# Patient Record
Sex: MALE | Race: WHITE | Employment: STUDENT | URBAN - METROPOLITAN AREA
[De-identification: names, ages, dates, MRNs, and addresses within clinical notes are randomized per-mention and may not be internally consistent; named-entity substitution may affect disease eponyms.]

---

## 2018-09-30 ENCOUNTER — HOSPITAL ENCOUNTER (INPATIENT)
Facility: CLINIC | Age: 23
LOS: 4 days | Discharge: HOME | End: 2018-10-05
Attending: PSYCHIATRY & NEUROLOGY | Admitting: PSYCHIATRY & NEUROLOGY
Payer: COMMERCIAL

## 2018-09-30 DIAGNOSIS — R45.851 SUICIDAL IDEATION: ICD-10-CM

## 2018-09-30 DIAGNOSIS — F41.1 GAD (GENERALIZED ANXIETY DISORDER): Primary | ICD-10-CM

## 2018-09-30 DIAGNOSIS — F12.93 CANNABIS WITHDRAWAL (H): ICD-10-CM

## 2018-09-30 DIAGNOSIS — F33.2 SEVERE RECURRENT MAJOR DEPRESSION WITHOUT PSYCHOTIC FEATURES (H): ICD-10-CM

## 2018-09-30 DIAGNOSIS — F33.1 MAJOR DEPRESSIVE DISORDER, RECURRENT EPISODE, MODERATE (H): ICD-10-CM

## 2018-09-30 DIAGNOSIS — F12.10 CANNABIS ABUSE: ICD-10-CM

## 2018-09-30 LAB
AMPHETAMINES UR QL SCN: NEGATIVE
BARBITURATES UR QL: NEGATIVE
BENZODIAZ UR QL: NEGATIVE
CANNABINOIDS UR QL SCN: POSITIVE
COCAINE UR QL: NEGATIVE
ETHANOL UR QL SCN: NEGATIVE
OPIATES UR QL SCN: NEGATIVE

## 2018-09-30 PROCEDURE — 90791 PSYCH DIAGNOSTIC EVALUATION: CPT

## 2018-09-30 PROCEDURE — 99285 EMERGENCY DEPT VISIT HI MDM: CPT | Mod: Z6 | Performed by: PSYCHIATRY & NEUROLOGY

## 2018-09-30 PROCEDURE — 99285 EMERGENCY DEPT VISIT HI MDM: CPT | Mod: 25 | Performed by: PSYCHIATRY & NEUROLOGY

## 2018-09-30 PROCEDURE — 25000132 ZZH RX MED GY IP 250 OP 250 PS 637: Performed by: PSYCHIATRY & NEUROLOGY

## 2018-09-30 PROCEDURE — 80307 DRUG TEST PRSMV CHEM ANLYZR: CPT | Performed by: PSYCHIATRY & NEUROLOGY

## 2018-09-30 PROCEDURE — 80320 DRUG SCREEN QUANTALCOHOLS: CPT | Performed by: PSYCHIATRY & NEUROLOGY

## 2018-09-30 RX ORDER — TRAZODONE HYDROCHLORIDE 100 MG/1
100 TABLET ORAL ONCE
Status: COMPLETED | OUTPATIENT
Start: 2018-09-30 | End: 2018-09-30

## 2018-09-30 RX ADMIN — TRAZODONE HYDROCHLORIDE 100 MG: 100 TABLET ORAL at 22:29

## 2018-09-30 ASSESSMENT — ENCOUNTER SYMPTOMS
GASTROINTESTINAL NEGATIVE: 1
SLEEP DISTURBANCE: 1
HALLUCINATIONS: 0
NEUROLOGICAL NEGATIVE: 1
ACTIVITY CHANGE: 1
NERVOUS/ANXIOUS: 1
CARDIOVASCULAR NEGATIVE: 1
HEMATOLOGIC/LYMPHATIC NEGATIVE: 1
MUSCULOSKELETAL NEGATIVE: 1
DYSPHORIC MOOD: 1
EYES NEGATIVE: 1
DECREASED CONCENTRATION: 1
HYPERACTIVE: 0
RESPIRATORY NEGATIVE: 1
ENDOCRINE NEGATIVE: 1

## 2018-09-30 NOTE — IP AVS SNAPSHOT
MRN:[id number]                      After Visit Summary   9/30/2018    Logan Drake    MRN: [id number]           Thank you!     Thank you for choosing [facility] for your care. Our goal is always to provide you with excellent care.        Patient Information     Date Of Birth          1995        Designated Caregiver       Most Recent Value    Caregiver    Will someone help with your care after discharge? no      About your hospital stay     You were admitted on:  October 1, 2018 You last received care in the:   [facility]    You were discharged on:  October 5, 2018       Who to Call     For medical emergencies, please call 911.  For non-urgent questions about your medical care, please call your primary care provider or clinic, [phone]          Attending Provider     Provider Specialty    Jose Cochran MD Psychiatry    Dongre, Danielito Sierra MD Psychiatry       Primary Care Provider Office Phone # Fax #    [facility] [phone]      Further instructions from your care team        Behavioral Discharge Planning and Instructions      Summary:  You were admitted on 9/30/2018  due to suicidal ideation and substance abuse..  You were treated by Dr Danielito Cartagena MD and discharged on 10/5/18 from [facility]  to [facility] [place].    Principal Diagnosis: Major Depressive Disorder, Recurrent, Severe; Marijuana Abuse Disorder    Health Care Follow-up Appointments: (Patient to set up appointments after discharge.)  [facility] ([facility] Services for medication management)  [address] [place] [address]  [phone]    Counseling Services (Therapists available)  [address] [place] [address]  [phone]  [facility] [place] [address]  [phone]    Additional Resources in [place]:  Outpatient [facility]  *Ask for new patient coordinator  [address] [place] [address]  [place]  [phone]    [facility] is not accepting new patients at this time.      Attend all scheduled appointments with your outpatient providers. Call at least 24 hours in advance if you need to reschedule an appointment to ensure continued access to your outpatient providers.   Major Treatments, Procedures and Findings:  You were provided with: a psychiatric assessment, assessed for medical stability, medication evaluation and/or management and group therapy    Symptoms to Report: thoughts of suicide    Early warning signs can include: increased depression or anxiety sleep disturbances increased thoughts or behaviors of suicide or self-harm     Safety and Wellness:  Take all medicines as directed.  Make no changes unless your doctor suggests them.      Follow treatment recommendations.  Refrain from alcohol and non-prescribed drugs.  If there is a concern for safety, call 911.    Resources:   Crisis Intervention: 551.152.1466 or 954-439-6324 (TTY: 907.735.7490).  Call anytime for help.  National [place] [employer] Mental Illness (www.mn.girish.org): 926.461.3412 or 014-199-9295.    The treatment team has appreciated the opportunity to work with you.     If you have any questions or concerns our unit number is [phone].      Pending Results     No orders found from 9/28/2018 to 10/1/2018.            Admission Information     Date & Time Provider Department Dept. Phone    9/30/2018 Danielito Cartagena MD [facility] [phone]      Your Vitals Were     Blood Pressure Pulse Temperature Respirations Height Weight    145/87 73 97.4  F (36.3  C) (Oral) 16 1.854 m (6' 1\") 77.2 kg (170 lb 3.2 oz)    Pulse Oximetry BMI (Body Mass Index)                95% 22.46 kg/m2          My[employer] A/S Information     [email or web address] lets you send messages to your doctor, view your test results, renew your prescriptions, schedule appointments and more. To sign up, go to [email or web address] . Click on \"Log in\" on the " "left side of the screen, which will take you to the Welcome page. Then click on \"Sign up Now\" on the right side of the page.     You will be asked to enter the access code listed below, as well as some personal information. Please follow the directions to create your username and password.     Your access code is: [email or web address]  Expires: 2019  4:11 PM     Your access code will  in 90 days. If you need help or a new code, please call your [facility] clinic or [phone].        Care EveryWhere ID     This is your Care EveryWhere ID. This could be used by other organizations to access your [facility] medical records  [id number]        Equal Access to Services     JANET ROGERS : Azul Francisco, del espinosa, ady lópez, adrianna cueva. So [facility] [phone].    ATENCIÓN: Si habla español, tiene a vee disposición servicios gratuitos de asistencia lingüística. Llame al [phone].    We comply with applicable federal civil rights laws and [place] laws. We do not discriminate on the basis of race, color, national origin, age, disability, sex, sexual orientation, or gender identity.               Review of your medicines      START taking        Dose / Directions    busPIRone 10 MG tablet   Commonly known as:  BUSPAR   Used for:  Major depressive disorder, recurrent episode, moderate (H)        Dose:  10 mg   Take 1 tablet (10 mg) by mouth 2 times daily   Quantity:  60 tablet   Refills:  1       hydrOXYzine 25 MG tablet   Commonly known as:  ATARAX        Dose:  25 mg   Take 1 tablet (25 mg) by mouth every 4 hours as needed for anxiety   Quantity:  30 tablet   Refills:  1       melatonin 3 MG tablet   Used for:  Major depressive disorder, recurrent episode, moderate (H)        Dose:  3 mg   Take 1 tablet (3 mg) by mouth At Bedtime   Quantity:  30 tablet   Refills:  1       mirtazapine 15 MG tablet   Commonly known as:  REMERON   Used for:  Major " depressive disorder, recurrent episode, moderate (H)        Dose:  15 mg   Take 1 tablet (15 mg) by mouth At Bedtime   Quantity:  30 tablet   Refills:  1       traZODone 50 MG tablet   Commonly known as:  DESYREL   Used for:  Major depressive disorder, recurrent episode, moderate (H)        Dose:  50 mg   Take 1 tablet (50 mg) by mouth nightly as needed for sleep   Quantity:  15 tablet   Refills:  1            Where to get your medicines      These medications were sent to [facility] [place], [place] - [address]  [address] [facility], [facility] [address]     Phone:  [phone]     busPIRone 10 MG tablet    hydrOXYzine 25 MG tablet    melatonin 3 MG tablet    mirtazapine 15 MG tablet    traZODone 50 MG tablet                Protect others around you: Learn how to safely use, store and throw away your medicines at www.disposemymeds.org.             Medication List: This is a list of all your medications and when to take them. Check marks below indicate your daily home schedule. Keep this list as a reference.      Medications           Morning Afternoon Evening Bedtime As Needed    busPIRone 10 MG tablet   Commonly known as:  BUSPAR   Take 1 tablet (10 mg) by mouth 2 times daily   Last time this was given:  10 mg on 10/5/2018  8:38 AM                                hydrOXYzine 25 MG tablet   Commonly known as:  ATARAX   Take 1 tablet (25 mg) by mouth every 4 hours as needed for anxiety   Last time this was given:  25 mg on 10/4/2018  9:23 PM                                melatonin 3 MG tablet   Take 1 tablet (3 mg) by mouth At Bedtime   Last time this was given:  3 mg on 10/4/2018  9:23 PM                                mirtazapine 15 MG tablet   Commonly known as:  REMERON   Take 1 tablet (15 mg) by mouth At Bedtime   Last time this was given:  15 mg on 10/4/2018  9:23 PM                                traZODone 50 MG tablet   Commonly known as:  DESYREL   Take 1 tablet (50 mg) by mouth  nightly as needed for sleep   Last time this was given:  100 mg on 9/30/2018 10:29 PM

## 2018-09-30 NOTE — LETTER
To Whom It May Concern,      Logan Drake was hospitalized at [facility] from 9/30/2018 - 10/05/2018, and I was his inpatient psychiatrist during his time. We discussed medications and therapy as useful treatment modalities to treat his depression and anxiety. The patient has found benefit with a therapy animal in the past. After learning the useful benefit of a therapy animal in individuals with high anxiety and depression, I will consider this an adjunctive treatment modality (along with continued medications and therapy) to help with his psychiatric diagnoses. For therapeutic purposes, please consider allowing Logan to have an animal (dog or a cat) at his place of living. Thank you.    Sincerely,      Danielito Cartagena MD  [facility] Psychiatry  [facility]

## 2018-09-30 NOTE — IP AVS SNAPSHOT
[address] [address]    Phone:  [phone]                                       After Visit Summary   9/30/2018    Logan Drake    MRN: [id number]           After Visit Summary Signature Page     I have received my discharge instructions, and my questions have been answered. I have discussed any challenges I see with this plan with the nurse or doctor.    ..........................................................................................................................................  Patient/Patient Representative Signature      ..........................................................................................................................................  Patient Representative Print Name and Relationship to Patient    ..................................................               ................................................  Date                                   Time    ..........................................................................................................................................  Reviewed by Signature/Title    ...................................................              ..............................................  Date                                               Time          22EPIC Rev 08/18

## 2018-10-01 PROBLEM — R45.851 SUICIDAL IDEATION: Status: ACTIVE | Noted: 2018-10-01

## 2018-10-01 PROCEDURE — 99207 ZZC CDG-HISTORY COMP: MEETS EXP. PROB FOCUSED- DOWN CODED LACK OF PFSH: CPT | Performed by: PSYCHIATRY & NEUROLOGY

## 2018-10-01 PROCEDURE — G0177 OPPS/PHP; TRAIN & EDUC SERV: HCPCS

## 2018-10-01 PROCEDURE — 12400007 ZZH R&B MH INTERMEDIATE UMMC

## 2018-10-01 PROCEDURE — 99221 1ST HOSP IP/OBS SF/LOW 40: CPT | Mod: AI | Performed by: PSYCHIATRY & NEUROLOGY

## 2018-10-01 PROCEDURE — 25000132 ZZH RX MED GY IP 250 OP 250 PS 637: Performed by: PSYCHIATRY & NEUROLOGY

## 2018-10-01 RX ORDER — ALUMINA, MAGNESIA, AND SIMETHICONE 2400; 2400; 240 MG/30ML; MG/30ML; MG/30ML
30 SUSPENSION ORAL EVERY 4 HOURS PRN
Status: DISCONTINUED | OUTPATIENT
Start: 2018-10-01 | End: 2018-10-05 | Stop reason: HOSPADM

## 2018-10-01 RX ORDER — MIRTAZAPINE 7.5 MG/1
7.5 TABLET, FILM COATED ORAL AT BEDTIME
Status: DISCONTINUED | OUTPATIENT
Start: 2018-10-01 | End: 2018-10-02

## 2018-10-01 RX ORDER — ACETAMINOPHEN 325 MG/1
650 TABLET ORAL EVERY 4 HOURS PRN
Status: DISCONTINUED | OUTPATIENT
Start: 2018-10-01 | End: 2018-10-05 | Stop reason: HOSPADM

## 2018-10-01 RX ORDER — OLANZAPINE 10 MG/1
10 TABLET ORAL
Status: DISCONTINUED | OUTPATIENT
Start: 2018-10-01 | End: 2018-10-05 | Stop reason: HOSPADM

## 2018-10-01 RX ORDER — LANOLIN ALCOHOL/MO/W.PET/CERES
3 CREAM (GRAM) TOPICAL AT BEDTIME
Status: DISCONTINUED | OUTPATIENT
Start: 2018-10-01 | End: 2018-10-05 | Stop reason: HOSPADM

## 2018-10-01 RX ORDER — LANOLIN ALCOHOL/MO/W.PET/CERES
3 CREAM (GRAM) TOPICAL
Status: DISCONTINUED | OUTPATIENT
Start: 2018-10-01 | End: 2018-10-01

## 2018-10-01 RX ORDER — BUSPIRONE HYDROCHLORIDE 10 MG/1
10 TABLET ORAL 2 TIMES DAILY
Status: DISCONTINUED | OUTPATIENT
Start: 2018-10-01 | End: 2018-10-05 | Stop reason: HOSPADM

## 2018-10-01 RX ORDER — OLANZAPINE 10 MG/2ML
10 INJECTION, POWDER, FOR SOLUTION INTRAMUSCULAR
Status: DISCONTINUED | OUTPATIENT
Start: 2018-10-01 | End: 2018-10-05 | Stop reason: HOSPADM

## 2018-10-01 RX ORDER — TRAZODONE HYDROCHLORIDE 50 MG/1
50 TABLET, FILM COATED ORAL
Status: DISCONTINUED | OUTPATIENT
Start: 2018-10-01 | End: 2018-10-05 | Stop reason: HOSPADM

## 2018-10-01 RX ORDER — HYDROXYZINE HYDROCHLORIDE 25 MG/1
25 TABLET, FILM COATED ORAL EVERY 4 HOURS PRN
Status: DISCONTINUED | OUTPATIENT
Start: 2018-10-01 | End: 2018-10-05 | Stop reason: HOSPADM

## 2018-10-01 RX ADMIN — BUSPIRONE HYDROCHLORIDE 10 MG: 10 TABLET ORAL at 21:00

## 2018-10-01 RX ADMIN — MIRTAZAPINE 7.5 MG: 7.5 TABLET ORAL at 21:00

## 2018-10-01 RX ADMIN — MELATONIN TAB 3 MG 3 MG: 3 TAB at 21:00

## 2018-10-01 RX ADMIN — BUSPIRONE HYDROCHLORIDE 10 MG: 10 TABLET ORAL at 13:05

## 2018-10-01 ASSESSMENT — ACTIVITIES OF DAILY LIVING (ADL)
ORAL_HYGIENE: INDEPENDENT
DRESS: 0-->INDEPENDENT
TRANSFERRING: 0-->INDEPENDENT
RETIRED_COMMUNICATION: 0-->UNDERSTANDS/COMMUNICATES WITHOUT DIFFICULTY
TOILETING: 0-->INDEPENDENT
BATHING: 0-->INDEPENDENT
DRESS: INDEPENDENT;SCRUBS (BEHAVIORAL HEALTH)
ORAL_HYGIENE: INDEPENDENT
COGNITION: 0 - NO COGNITION ISSUES REPORTED
AMBULATION: 0-->INDEPENDENT
GROOMING: INDEPENDENT
FALL_HISTORY_WITHIN_LAST_SIX_MONTHS: NO
DRESS: INDEPENDENT
LAUNDRY: WITH SUPERVISION
GROOMING: INDEPENDENT
SWALLOWING: 0-->SWALLOWS FOODS/LIQUIDS WITHOUT DIFFICULTY
RETIRED_EATING: 0-->INDEPENDENT

## 2018-10-01 NOTE — ED NOTES
"ED to Behavioral Floor Handoff    SITUATION  Logan Drake is a 23 year old male who speaks English and lives in a home with family members The patient arrived in the ED by private car from home with a complaint of Suicidal (per pt started getting suicidal thoughts this Thurs denies having suicidal plans. Increase depression this week, poor appetite, anhedonia. Today was suppose to drive back to [place] for school, his Mother asked if he would be okay, pt responded no \" I might not make it to [place]\" Parents brought pt in to ER for eval. Denies being officially dx with Depression, \"depression runs strongly in my Mother side\". Denies seeing therapist or Psychiatrist. )  .The patient's current symptoms started/worsened 1 and 10 day(s) ago and during this time the symptoms have increased.   In the ED, pt was diagnosed with   Final diagnoses:   Major depressive disorder, recurrent episode, moderate (H)   Suicidal ideation   Cannabis abuse        Initial vitals were: BP: 141/86  Pulse: 88  Temp: 98.9  F (37.2  C)  Resp: 16  Height: 185.4 cm (6' 1\")  Weight: 78.4 kg (172 lb 12.8 oz)  SpO2: 98 %   --------  Is the patient diabetic? No   If yes, last blood glucose? --     If yes, was this treated in the ED? --  --------  Is the patient inebriated (ETOH) No or Impaired on other substances? No  MSSA done? N/A  Last MSSA score: --    Were withdrawal symptoms treated? N/A  Does the patient have a seizure history? No. If yes, date of most recent seizure--  --------  Is the patient patient experiencing suicidal ideation? reports occasional suicidal thoughts representing feeling that life is not worth feeling    Homicidal ideation? denies current or recent homicidal ideation or behaviors.    Self-injurious behavior/urges? denies current or recent self injurious behavior or ideation.  ------  Was pt aggressive in the ED No  Was a code called No  Is the pt now cooperative? Yes  -------  Meds given in ED:   Medications " "  traZODone (DESYREL) tablet 100 mg (100 mg Oral Given 9/30/18 4264)      Family present during ED course? Yes  Family currently present? No    BACKGROUND  Does the patient have a cognitive impairment or developmental disability? No  Allergies: No Known Allergies.   Social demographics are   Social History     Social History     Marital status: Single     Spouse name: N/A     Number of children: N/A     Years of education: N/A     Social History Main Topics     Smoking status: Never Smoker     Smokeless tobacco: Never Used     Alcohol use Yes     Drug use: Yes      Comment: marijuana     Sexual activity: Not Asked     Other Topics Concern     None     Social History Narrative     None        ASSESSMENT  Labs results   Labs Ordered and Resulted from Time of ED Arrival Up to the Time of Departure from the ED   DRUG ABUSE SCREEN 6 CHEM DEP URINE ([facility]) - Abnormal; Notable for the following:        Result Value    Cannabinoids Qual Urine Positive (*)     All other components within normal limits      Imaging Studies: No results found for this or any previous visit (from the past 24 hour(s)).   Most recent vital signs BP (!) 133/92  Pulse 81  Temp 98.9  F (37.2  C) (Oral)  Resp 16  Ht 1.854 m (6' 1\")  Wt 78.4 kg (172 lb 12.8 oz)  SpO2 98%  BMI 22.8 kg/m2   Abnormal labs/tests/findings requiring intervention:---   Pain control: pt had none  Nausea control: pt had none    RECOMMENDATION  Are any infection precautions needed (MRSA, VRE, etc.)? No If yes, what infection? --  ---  Does the patient have mobility issues? independently. If yes, what device does the pt use? ---  ---  Is patient on 72 hour hold or commitment? No If on 72 hour hold, have hold and rights been given to patient? N/A  Are admitting orders written if after 10 p.m. ?N/A  Tasks needing to be completed:---     Cecy guzman-- [phone] [address] ED   [phone] [facility] ED    "

## 2018-10-01 NOTE — ED PROVIDER NOTES
"  History     Chief Complaint   Patient presents with     Suicidal     per pt started getting suicidal thoughts this Thurs denies having suicidal plans. Increase depression this week, poor appetite, anhedonia. Today was suppose to drive back to [place] for school, his Mother asked if he would be okay, pt responded no \" I might not make it to [place]\" Parents brought pt in to ER for eval. Denies being officially dx with Depression, \"depression runs strongly in my Mother side\". Denies seeing therapist or Psychiatrist.      The history is provided by the patient.     Logan Drake is a 23 year old male who is here brought in by parents due to concerns of depression and suicidal thinking. Patient reports history of depression dating back to High School. He currently is in college at [place], [place]. He is a senior. He came home this weekend as she was feeling depressed. Patient was going to return to [place] today but felt he was too depressed and was suicidal. He told parents that he did not know if he would make it to [place]. Patient confessed that he has been smoking marijuana past year. He quit 1 week ago. He denies using other drugs. He is not on any meds nor is seeing a therapist. Patient has never attempted suicide nor been hospitalized psychiatrically. There is genetic loading for both chemical dependence and mood disorder. Mother works as a CD counselor for [employer]. She was seen here in August seeking help.    Please see DEC Crisis Assessment on 9/30/18 in [facility] for further details.    PERSONAL MEDICAL HISTORY  History reviewed. No pertinent past medical history.  PAST SURGICAL HISTORY  History reviewed. No pertinent surgical history.  FAMILY HISTORY  No family history on file.  SOCIAL HISTORY  Social History   Substance Use Topics     Smoking status: Never Smoker     Smokeless tobacco: Never Used     Alcohol use Yes     MEDICATIONS  No current facility-administered medications for this " "encounter.      No current outpatient prescriptions on file.     ALLERGIES  No Known Allergies      I have reviewed the Medications, Allergies, Past Medical and Surgical History, and Social History in the Epic system.    Review of Systems   Constitutional: Positive for activity change.   HENT: Negative.    Eyes: Negative.    Respiratory: Negative.    Cardiovascular: Negative.    Gastrointestinal: Negative.    Endocrine: Negative.    Genitourinary: Negative.    Musculoskeletal: Negative.    Skin: Negative.    Neurological: Negative.    Hematological: Negative.    Psychiatric/Behavioral: Positive for decreased concentration, dysphoric mood, sleep disturbance and suicidal ideas. Negative for hallucinations. The patient is nervous/anxious. The patient is not hyperactive.    All other systems reviewed and are negative.      Physical Exam   BP: 141/86  Pulse: 88  Temp: 98.9  F (37.2  C)  Resp: 16  Height: 185.4 cm (6' 1\")  Weight: 78.4 kg (172 lb 12.8 oz)      Physical Exam   Constitutional: He appears well-developed.   HENT:   Head: Normocephalic.   Eyes: Pupils are equal, round, and reactive to light.   Neck: Normal range of motion.   Cardiovascular: Normal rate.    Pulmonary/Chest: Effort normal.   Abdominal: Soft.   Musculoskeletal: Normal range of motion.   Neurological: He is alert.   Skin: Skin is warm.   Psychiatric: His speech is normal. Judgment normal. He is withdrawn. He is not agitated, not aggressive, not hyperactive, not actively hallucinating and not combative. Thought content is paranoid and delusional. Cognition and memory are normal. He exhibits a depressed mood. He expresses homicidal and suicidal ideation.   Nursing note and vitals reviewed.      ED Course     ED Course     Procedures    Labs Ordered and Resulted from Time of ED Arrival Up to the Time of Departure from the ED - No data to display         Assessments & Plan (with Medical Decision Making)   Patient with recurrent MDD who has been " abusing THC this past year but had stopped using 1 week ago. He now finds himself depressed and suicidal. He feels unsafe. He is open to being hospitalized. He is referred for admission.    I have reviewed the nursing notes.    I have reviewed the findings, diagnosis, plan and need for follow up with the patient.    New Prescriptions    No medications on file       Final diagnoses:   Major depressive disorder, recurrent episode, moderate (H)   Suicidal ideation   Cannabis abuse       9/30/2018   [facility], [place], EMERGENCY DEPARTMENT     Jose Cochran MD  09/30/18 1940

## 2018-10-01 NOTE — PROGRESS NOTES
INITIAL OT NOTE     10/01/18 1400   Occupational Therapy   Type of Intervention structured groups   Response Initiates, socially acceptable   Hours 1     Pt attended 1 out of 3 occupational therapy groups this date. Pt actively participated in a structured occupational therapy group with a focus on a visual-spatial leisure task. Pt was able to follow 2-step directions of the novel task, and demonstrated strategic planning and problem solving throughout task. Pt remained focused for full duration of group. Pt was pleasant and engaged with a congruent affect this date. Will continue to assess, initial assessment and OT care plan/goals to be completed upon additional group participation.

## 2018-10-01 NOTE — PLAN OF CARE
"Problem: Mood Impairment (Depressive Signs/Symptoms) (Adult)  Goal: Improved Mood Symptoms (Depressive Signs/Symptoms)  1) Pt will remain safe without any self harm during hospitalization.  2) Patient will have decreased thoughts of self harm and/or suicidal ideation  3) Patient will report improved sleep and feeling rested upon waking.  4) Patient will have adequate daily oral intake.  5) Patient will have improvement in self-care, including personal hygiene.  6) Patient will verbalize and demonstrate an ability to cope for their age.  7) Patient will be able to participate and initiate activities and conversation with others.  8) Patient will discuss feelings of hopelessness and and express an interest in the future.  9) By discharge the patient will report an increase in sense of control over their current situation.(i.e by verbalizing coping techniques to help get their life back on track and/or naming people they can talk to when they need emotional assistance).   Outcome: No Change    amelia Ford \"Tory\" is a 23 year college student admitted on a voluntary status. Pt has a hx of depression. Pt was bib parents after he verbalized suicidal ideation and inability to maintain his safety if he were to return to [place] where he is a student. Per reports and confirmed by pt, he has been dealing with depression for years.since 6th grade) Depression worsened a year ago after a breakup. He has also been using marijuana consistently for a year +, but recently stopped about a week ago. Utox was (+) for THC. Prior to stopping marijuana, pt reports he has been using it to cope with insomnia, and was able to get 6-7 hours of sleep nightly. After stopping marijuana, suicidal ideation, depression, sleep disturbance have all worsened. Appetite has been poor and pt reports has lost more than 10 Lbs in the last month. (Dietary consult ordered) Also reports increased in suicidal ideations with no specific plan. Pt denies " any prior suicide attempt. Current stressors are school and money. ( pt reports has to work 2 jobs to support himself and is taking full time classes) Pt is not currently on any medications, but has had a short trial with Prozac for just a month about 3-4 years ago. Pt does not have a psychiatrist and does not see a therapist.    On arrival to unit, pt is calm, polite and cooperative with search and admission interview. Presents with a depressed, flat and blunted affect. Denies any AH/VH or HI. Currently denying wish to be dead and has contracted for safety on the unit. Pt endorsing depression 8/10, anxiety 4/10. Pt reports self harming behavior by head banging on wall. (has been placed on SIB precaution). Reports is feeling hopeful and is open to trying medications and therapy to manage his depression. Admission profile completed.   Noted an abrasion to left shin, area slightly red, no drainage.  Pt oriented to unit, questions answered. Pt declines the flu shot at this time.  Is currently in bed. Denies any acute concerns at this time. Nursing will continue to monitor.    Notification of Admission  Per pt, both parents already aware of his admission at this time. He declines offer to contact them.

## 2018-10-01 NOTE — PLAN OF CARE
Problem: Patient Care Overview  Goal: Team Discussion  Team Plan:   BEHAVIORAL TEAM DISCUSSION    Participants: Dr. Danielito Cartagena; Shanell MEJÍA; Payal Willingham Rn    Progress: The patient came to the last two groups of the day.  He is taking all medications prescribed and compliant with care.    Continued Stay Criteria/Rationale: New admission    Medical/Physical: See Consult    Precautions:   Behavioral Orders   Procedures     Code 1 - Restrict to Unit     Routine Programming     As clinically indicated     Self Injury Precaution     Status 15     Every 15 minutes.     Suicide precautions     Patients on Suicide Precautions should have a Combination Diet ordered that includes a Diet selection(s) AND a Behavioral Tray selection for Safe Tray - with utensils, or Safe Tray - NO utensils       Plan: The patient needs psychiatric medication management and stabilization of his mood.  He will continue to be encouraged to attend all unit programming and let the staff know if he has questions or concerns.  CTC to ensure he has outpatient psychiatric and therapy appointments at discharge.    Rationale for change in precautions or plan: No changes

## 2018-10-01 NOTE — PROGRESS NOTES
"Pt was calm with blunted affect, withdrawn from others, did not attend groups. Pt reports feeling better than yesterday, but \"not much\". He endorses 7/10 depression and 4/10 anxiety. Denies SI or SIB thoughts. Denies hallucinations. Pt says he is able to concentrate well enough to read. Endorses racing thoughts about wanting to see his family and girlfriend. Appetite is poor, pt has only eat \"a pudding cup\" since admission. He is hopeful that things will get better. His goal today is \"to feel better than I did yesterday\".      10/01/18 1200   Behavioral Health   Hallucinations denies / not responding to hallucinations   Thinking distractable   Orientation person: oriented;place: oriented;date: oriented;time: oriented   Memory baseline memory   Insight insight appropriate to situation   Judgement impaired   Eye Contact out of corner of eyes;at examiner   Affect blunted, flat   Mood depressed;anxious   Physical Appearance/Attire attire appropriate to age and situation   Hygiene other (see comment)  (Fair)   Suicidality other (see comments)  (Denies)   1. Wish to be Dead No   2. Non-Specific Active Suicidal Thoughts  No   Self Injury other (see comment)  (Denies)   Elopement (Nothing stated or observed)   Activity withdrawn;other (see comment)  (Visible in the milieu)   Speech clear;coherent   Medication Sensitivity no stated side effects;no observed side effects   Psychomotor / Gait balanced;steady     "

## 2018-10-01 NOTE — PROGRESS NOTES
"CLINICAL NUTRITION SERVICES - ASSESSMENT NOTE     Nutrition Prescription    RECOMMENDATIONS FOR MDs/PROVIDERS TO ORDER:  None today    Malnutrition Status:    Non-severe malnutrition in the context of environmental or social circumstances    Recommendations already ordered by Registered Dietitian (RD):  None today - pt declined supplements discussed    Nursing/staff - please encourage meal intakes at lunch and dinner (pt usually skips breakfast) and snacks available on unit    Future/Additional Recommendations:  Consider need for scheduled snacks/supplements pending pt's wt trends and po intakes.     REASON FOR ASSESSMENT  Logan Drake is a/an 23 year old male assessed by the dietitian for Admission Nutrition Risk Screen for unintentional loss of 10# or more in the past two months AND RN Consult - \"Poor appetite, with recent wt loss of 10# in the last month\"    NUTRITION HISTORY  - Reports no known food allergies or intolerances  - Appetite has been \"non-existent for the past week\" and says his appetite was \"fine\" previously. He believes his reduced appetite is d/t stopped using marijuana a week ago.  - Usual intakes of 2 meals/day (skips breakfast d/t not hungry), some days eats a third meal late at night. Snacks throughout day. During the past week, he reports only eating a few meals, some days only eating bites of his food.    CURRENT NUTRITION ORDERS  Diet: Regular  Intake/Tolerance: Since admit, had pudding cup last night but only had bites of breakfast - though it is normal for pt to skip breakfast at home. Pt reports still not feeling very hungry. Denies N/V, constipation, and diarrhea.    LABS reviewed - no BMP available    MEDICATIONS reviewed    ANTHROPOMETRICS  Height: 185.4 cm (6' 1\")  Most Recent Weight: 76.2 kg (168 lb)    IBW: 83.6 kg (91% IBW)  BMI: Normal BMI  Weight History: Per pt, UBW anywhere from 160-175#. He reports weighing himself at home ~2 weeks ago and weighed 178#. Weighed himself " again a few days ago and was 168#. This indicates 10# (5.6%) wt loss over the past ~2 weeks per pt report. No wt information available in CareEverywhere at this time.  Wt Readings from Last 10 Encounters:   10/01/18 76.2 kg (168 lb)     Dosing Weight: 76 kg (actual)    ASSESSED NUTRITION NEEDS  Estimated Energy Needs: 6897-9920+ kcals/day (25 - 30+ kcals/kg)  Justification: Maintenance, recent wt loss  Estimated Protein Needs: 61-76 grams protein/day (0.8 - 1 grams of pro/kg)  Justification: Maintenance  Estimated Fluid Needs: 1 mL/kcal, or per provider  Justification: Maintenance    PHYSICAL FINDINGS  See malnutrition section below.    MALNUTRITION  % Intake: < 75% for > 7 days (non-severe)  % Weight Loss: > 2% in 1 week (severe)  Subcutaneous Fat Loss: None observed  Muscle Loss: None observed  Fluid Accumulation/Edema: None noted  Malnutrition Diagnosis: Non-severe malnutrition in the context of environmental or social circumstances    NUTRITION DIAGNOSIS  Inadequate oral intake related to poor appetite, decreasing desire to eat as evidenced by wt loss of 5.6% over the past ~2 weeks and pt's report of intakes over past week and since admit.      INTERVENTIONS  Implementation  Nutrition Education: Encouraged pt to consume at least 2 meals/day (as this is normal for pt) + snacks available on unit. Encouraged am snack given usually does not eat breakfast. Appetite to likely improve with time as mood improves.    Medical food supplement therapy - discussed supplement options, pt declined at this time    Goals  Patient to consume % of nutritionally adequate meal trays TID, or the equivalent with supplements/snacks.     Monitoring/Evaluation  Progress toward goals will be monitored and evaluated per protocol.    Lynn Stringer RD, LD  Unit pgr: [phone]

## 2018-10-01 NOTE — PHARMACY-ADMISSION MEDICATION HISTORY
Admission medication history for the September 30, 2018 admission is complete.     Interview sources:  Patient    Reliability of source: Patient was a good historian.    Medication compliance: Not applicable, not on any medications.    Preferred Outpatient Pharmacy: [facility] in [facility]     Changes made to PTA medication list (reason)  Added: None  Deleted: None  Changed: None    Additional medication history information:   Patient denies taking any prescriptions or medications over the counter.      Prior to Admission Medication List:  Prior to Admission medications    Not on File       Time spent: 15 minutes    Medication history completed by:   Elizabeth Skaggs, Pharmacy Intern

## 2018-10-01 NOTE — PROGRESS NOTES
10/01/18 0316   Patient Belongings   Did you bring any home meds/supplements to the hospital?  No   Patient Belongings other (see comments)   Disposition of Belongings Locker   Belongings Search Yes   Clothing Search Yes   Second Staff E.AAb     In locker:  Cell phone  Cup   Pair of socks  Pair of underwear    A               Admission:  I am responsible for any personal items that are not sent to the safe or pharmacy.  [facility] is not responsible for loss, theft or damage of any property in my possession.    Signature:  _________________________________ Date: _______  Time: _____                                              Staff Signature:  ____________________________ Date: ________  Time: _____      2nd Staff person, if patient is unable/unwilling to sign:    Signature: ________________________________ Date: ________  Time: _____     Discharge:  [facility] has returned all of my personal belongings:    Signature: _________________________________ Date: ________  Time: _____                                          Staff Signature:  ____________________________ Date: ________  Time: _____

## 2018-10-01 NOTE — PROGRESS NOTES
Initial Psychosocial Assessment    I have reviewed the chart, met with the patient, and developed Care Plan.      Presenting Problem:  The patient is a 23 year-old, male patient admitted with suicidal ideation and substance abuse.  Brought to the hospital ED by his parents. Patient is college student carrying 14 credits, working two part-time, jobs and facing mounting school debt.  Patient admitted to smoking 7 grams of marijuana every 3-4 days at a street value of about $400.00/wk.  This is the patient's first psychiatric admission to [facility].    History of Mental Health and Chemical Dependency:  History of depression dating back to high school. Was on Prozac years ago but stopped taking it due to weight loss, anxiety and insomnia.    Family Description (Constellation, Family Psychiatric History):  Patient is single, never , no children of issue.  He was raised with his younger sister age 19 by his parents.  Mother is a CD Counselor at [employer] and father is an autobody instructor at [email or web address].  There is mental health and addiction issues in the family of origin.    Significant Life Events (Illness, Abuse, Trauma, Death):  None    Living Situation:  Patient is a Senior at [employer] where he resides.    Educational Background:  Senior at [employer] majoring in Economics.    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    Financial Status:  Wages, loans and mounting debt.    Legal Issues:  None    Ethnic/Cultural Issues:  None    Spiritual Orientation:  Identifies as a Congregational.     Service History:  None    Current Treatment Providers are:  Patient sees Primary Care doctors at [facility] in [place], [place].    Social Service Assessment/Plan:  Patient needs psychiatric assessment, medication management and stabilization of his mood.  Will provide the patient with a letter at discharge to present  to his school. He will be encouraged to talk to staff with any concerns or questions he may have.  He has been encouraged to attend all unit programming offered. CTC to ensure that the patient has a comprehensive care plan in place at discharge.

## 2018-10-01 NOTE — ED NOTES
Part of assessment;    Stated that he started suffering from increased insomnia when he stopped smoking marijuana about one week ago.  Said that he has only been sleeping about 3-4 hours a night for about one week and on Thursday he got out of bed at about 3am and started hitting his head against the wall.    He also stated he is a senior in college at [employer] and is taking 14 credits.  Said he is working 20-25 hours a week between his job at [employer] and his internship as a .

## 2018-10-02 LAB
ALBUMIN SERPL-MCNC: 4.7 G/DL (ref 3.4–5)
ALP SERPL-CCNC: 76 U/L (ref 40–150)
ALT SERPL W P-5'-P-CCNC: 28 U/L (ref 0–70)
ANION GAP SERPL CALCULATED.3IONS-SCNC: 5 MMOL/L (ref 3–14)
AST SERPL W P-5'-P-CCNC: 13 U/L (ref 0–45)
BASOPHILS # BLD AUTO: 0 10E9/L (ref 0–0.2)
BASOPHILS NFR BLD AUTO: 0.4 %
BILIRUB DIRECT SERPL-MCNC: 0.2 MG/DL (ref 0–0.2)
BILIRUB SERPL-MCNC: 0.8 MG/DL (ref 0.2–1.3)
BUN SERPL-MCNC: 15 MG/DL (ref 7–30)
CALCIUM SERPL-MCNC: 9.2 MG/DL (ref 8.5–10.1)
CHLORIDE SERPL-SCNC: 107 MMOL/L (ref 94–109)
CO2 SERPL-SCNC: 30 MMOL/L (ref 20–32)
CREAT SERPL-MCNC: 1.31 MG/DL (ref 0.66–1.25)
DIFFERENTIAL METHOD BLD: NORMAL
EOSINOPHIL # BLD AUTO: 0.1 10E9/L (ref 0–0.7)
EOSINOPHIL NFR BLD AUTO: 1 %
ERYTHROCYTE [DISTWIDTH] IN BLOOD BY AUTOMATED COUNT: 12.1 % (ref 10–15)
GFR SERPL CREATININE-BSD FRML MDRD: 69 ML/MIN/1.7M2
GLUCOSE SERPL-MCNC: 118 MG/DL (ref 70–99)
HCT VFR BLD AUTO: 48 % (ref 40–53)
HGB BLD-MCNC: 16.4 G/DL (ref 13.3–17.7)
IMM GRANULOCYTES # BLD: 0 10E9/L (ref 0–0.4)
IMM GRANULOCYTES NFR BLD: 0 %
LYMPHOCYTES # BLD AUTO: 2.3 10E9/L (ref 0.8–5.3)
LYMPHOCYTES NFR BLD AUTO: 44.1 %
MCH RBC QN AUTO: 30.5 PG (ref 26.5–33)
MCHC RBC AUTO-ENTMCNC: 34.2 G/DL (ref 31.5–36.5)
MCV RBC AUTO: 89 FL (ref 78–100)
MONOCYTES # BLD AUTO: 0.5 10E9/L (ref 0–1.3)
MONOCYTES NFR BLD AUTO: 9.7 %
NEUTROPHILS # BLD AUTO: 2.3 10E9/L (ref 1.6–8.3)
NEUTROPHILS NFR BLD AUTO: 44.8 %
NRBC # BLD AUTO: 0 10*3/UL
NRBC BLD AUTO-RTO: 0 /100
PLATELET # BLD AUTO: 198 10E9/L (ref 150–450)
POTASSIUM SERPL-SCNC: 4.3 MMOL/L (ref 3.4–5.3)
PROT SERPL-MCNC: 8.5 G/DL (ref 6.8–8.8)
RBC # BLD AUTO: 5.37 10E12/L (ref 4.4–5.9)
SODIUM SERPL-SCNC: 142 MMOL/L (ref 133–144)
TSH SERPL DL<=0.005 MIU/L-ACNC: 1.52 MU/L (ref 0.4–4)
WBC # BLD AUTO: 5.2 10E9/L (ref 4–11)

## 2018-10-02 PROCEDURE — 80048 BASIC METABOLIC PNL TOTAL CA: CPT | Performed by: PSYCHIATRY & NEUROLOGY

## 2018-10-02 PROCEDURE — 85025 COMPLETE CBC W/AUTO DIFF WBC: CPT | Performed by: PSYCHIATRY & NEUROLOGY

## 2018-10-02 PROCEDURE — G0177 OPPS/PHP; TRAIN & EDUC SERV: HCPCS

## 2018-10-02 PROCEDURE — 12400007 ZZH R&B MH INTERMEDIATE UMMC

## 2018-10-02 PROCEDURE — 36415 COLL VENOUS BLD VENIPUNCTURE: CPT | Performed by: PSYCHIATRY & NEUROLOGY

## 2018-10-02 PROCEDURE — 97127 ZZHC OT THERAPEUTIC INTERVENTION W/FOCUS ON COGNITIVE FUNCTION,EA 15 MIN: CPT | Mod: GO

## 2018-10-02 PROCEDURE — 99231 SBSQ HOSP IP/OBS SF/LOW 25: CPT | Performed by: PSYCHIATRY & NEUROLOGY

## 2018-10-02 PROCEDURE — 84443 ASSAY THYROID STIM HORMONE: CPT | Performed by: PSYCHIATRY & NEUROLOGY

## 2018-10-02 PROCEDURE — 80076 HEPATIC FUNCTION PANEL: CPT | Performed by: PSYCHIATRY & NEUROLOGY

## 2018-10-02 PROCEDURE — 25000132 ZZH RX MED GY IP 250 OP 250 PS 637: Performed by: PSYCHIATRY & NEUROLOGY

## 2018-10-02 RX ORDER — MIRTAZAPINE 15 MG/1
15 TABLET, FILM COATED ORAL AT BEDTIME
Status: DISCONTINUED | OUTPATIENT
Start: 2018-10-02 | End: 2018-10-05 | Stop reason: HOSPADM

## 2018-10-02 RX ADMIN — MIRTAZAPINE 15 MG: 15 TABLET, FILM COATED ORAL at 21:09

## 2018-10-02 RX ADMIN — MELATONIN TAB 3 MG 3 MG: 3 TAB at 21:09

## 2018-10-02 RX ADMIN — BUSPIRONE HYDROCHLORIDE 10 MG: 10 TABLET ORAL at 08:46

## 2018-10-02 RX ADMIN — BUSPIRONE HYDROCHLORIDE 10 MG: 10 TABLET ORAL at 21:09

## 2018-10-02 ASSESSMENT — ACTIVITIES OF DAILY LIVING (ADL)
LAUNDRY: WITH SUPERVISION
ORAL_HYGIENE: INDEPENDENT
DRESS: INDEPENDENT
GROOMING: INDEPENDENT
GROOMING: INDEPENDENT
LAUNDRY: WITH SUPERVISION
DRESS: SCRUBS (BEHAVIORAL HEALTH)
ORAL_HYGIENE: INDEPENDENT

## 2018-10-02 NOTE — PROGRESS NOTES
"[facility], [place]   Psychiatric Progress Note        Interim History:   Reason for Hospitalization:Logan is a 23 year old male with history of depression and cannabis use disorder is admitted on a voluntary basis for worsening depressive thoughts in the context of recently discontinuing cannabis.     Subjective: \"Definitely feeling better compared to yesterday\"     As per today's interview: Patient as compliant with medications, denied intolerable s/e. Felt subjective improvement in thoughts and mood. Improved sleep at night.  Attending groups. Social with peers. Denies SI, intent or plan. Future oriented.          Medications:       busPIRone  10 mg Oral BID     melatonin  3 mg Oral At Bedtime     mirtazapine  15 mg Oral At Bedtime          Allergies:   No Known Allergies       Labs:     Recent Results (from the past 48 hour(s))   Drug abuse screen 6 urine (tox)    Collection Time: 09/30/18  7:21 PM   Result Value Ref Range    Amphetamine Qual Urine Negative NEG^Negative    Barbiturates Qual Urine Negative NEG^Negative    Benzodiazepine Qual Urine Negative NEG^Negative    Cannabinoids Qual Urine Positive (A) NEG^Negative    Cocaine Qual Urine Negative NEG^Negative    Ethanol Qual Urine Negative NEG^Negative    Opiates Qualitative Urine Negative NEG^Negative   CBC with platelets differential    Collection Time: 10/02/18  8:50 AM   Result Value Ref Range    WBC 5.2 4.0 - 11.0 10e9/L    RBC Count 5.37 4.4 - 5.9 10e12/L    Hemoglobin 16.4 13.3 - 17.7 g/dL    Hematocrit 48.0 40.0 - 53.0 %    MCV 89 78 - 100 fl    MCH 30.5 26.5 - 33.0 pg    MCHC 34.2 31.5 - 36.5 g/dL    RDW 12.1 10.0 - 15.0 %    Platelet Count 198 150 - 450 10e9/L    Diff Method Automated Method     % Neutrophils 44.8 %    % Lymphocytes 44.1 %    % Monocytes 9.7 %    % Eosinophils 1.0 %    % Basophils 0.4 %    % Immature Granulocytes 0.0 %    Nucleated RBCs 0 0 /100    Absolute Neutrophil 2.3 1.6 - 8.3 10e9/L    Absolute " "Lymphocytes 2.3 0.8 - 5.3 10e9/L    Absolute Monocytes 0.5 0.0 - 1.3 10e9/L    Absolute Eosinophils 0.1 0.0 - 0.7 10e9/L    Absolute Basophils 0.0 0.0 - 0.2 10e9/L    Abs Immature Granulocytes 0.0 0 - 0.4 10e9/L    Absolute Nucleated RBC 0.0    Basic metabolic panel    Collection Time: 10/02/18  8:50 AM   Result Value Ref Range    Sodium 142 133 - 144 mmol/L    Potassium 4.3 3.4 - 5.3 mmol/L    Chloride 107 94 - 109 mmol/L    Carbon Dioxide 30 20 - 32 mmol/L    Anion Gap 5 3 - 14 mmol/L    Glucose 118 (H) 70 - 99 mg/dL    Urea Nitrogen 15 7 - 30 mg/dL    Creatinine 1.31 (H) 0.66 - 1.25 mg/dL    GFR Estimate 69 >60 mL/min/1.7m2    GFR Estimate If Black 83 >60 mL/min/1.7m2    Calcium 9.2 8.5 - 10.1 mg/dL   TSH with free T4 reflex    Collection Time: 10/02/18  8:50 AM   Result Value Ref Range    TSH 1.52 0.40 - 4.00 mU/L   Hepatic panel    Collection Time: 10/02/18  8:50 AM   Result Value Ref Range    Bilirubin Direct 0.2 0.0 - 0.2 mg/dL    Bilirubin Total 0.8 0.2 - 1.3 mg/dL    Albumin 4.7 3.4 - 5.0 g/dL    Protein Total 8.5 6.8 - 8.8 g/dL    Alkaline Phosphatase 76 40 - 150 U/L    ALT 28 0 - 70 U/L    AST 13 0 - 45 U/L          Psychiatric Examination:   /87  Pulse 73  Temp 97.1  F (36.2  C) (Oral)  Resp 16  Ht 1.854 m (6' 1\")  Wt 75.8 kg (167 lb 1.6 oz)  SpO2 95%  BMI 22.05 kg/m2  Weight is 167 lbs 1.6 oz  Body mass index is 22.05 kg/(m^2).    Appearance:  male. Improved hygiene/grooming. No acute distress.   Attitude:  Guarded at times, tense.   Eye Contact:  Continuous   Mood:  Depressive   Affect:  Blunted overall, but shows more appropriate range.   Speech:  Soft in tone, slow in rate   Language: fluent and intact in English  Psychomotor, Gait, Musculoskeletal:  no evidence of tardive dyskinesia, dystonia, or tics  Throught Process:  logical and linear  Associations:  no loose associations  Thought Content:  no evidence of suicidal ideation or homicidal ideation, no evidence of psychotic " thought, no auditory hallucinations present and no visual hallucinations present  Insight:  fair  Judgement:  fair  Oriented to:  time, person, and place  Attention Span and Concentration:  fair  Recent and Remote Memory:  fair  Fund of Knowledge:  appropriate      Assessment & Plan       Principal Diagnosis:   Major depressive disorder, recurrent episode, moderate (H)  Cannabis Use Disorder   Cannabis Withdrawal   Generalized Anxiety Disorder     Assessment:   (Initial Assessment 10/1): Patient appears to have ongoing depressive symptoms over the years which he attempted to cope with marijuana. His recent depression and anxiety exacerbation could be perpetuated to his cannabis withdrawal symptoms as well. I discussed the dangers of cannabis use in a developing young adults brain. The underlying problem is inadequate treatment of his anxiety and depression. I discussed adding on Remeron for his sleep and appetite (which later can be titrated to a more antidepressive dose), Melatonin to regulate his sleep/wake cycle, Buspar 10 mg BID for anxiety relief (but no high sedation like benzos). Patient was motivated to see a CD counselor for his cannabis use.      (10/2): Improved subjective mood, and relative improvements in observable affect. Medication compliant, no intolerable effects. Attending groups. Open to future outpatient treatment. Sleep has improved.     Plan:  - Remeron 15 mg HS  - Buspar 10 mg BID  - Melatonin 3 mg         Legal Status: Voluntary    Safety Assessment:   Checks: Status 15  Precautions: Suicide  Self-harm  Pt has not required locked seclusion or restraints in the past 24 hours to maintain safety, please refer to RN documentation for further details.       The risks, benefits, alternatives and side effects have been discussed and are understood by the patient and other caregivers.       Anticipated Disposition/Discharge Date: Likely discharge by end of the week.     Attestation:  Patient has  been seen and evaluated by me,  Danielito Cartagena MD

## 2018-10-02 NOTE — H&P
"[facility], [place]   Psychiatric History & Physical  Admission date: 9/30/2018        Chief Complaint:   \"I've been using marijuana to help cope with stress\"         HPI:     Logan is a 23 year old male with history of depression and cannabis use disorder is admitted on a voluntary basis for worsening depressive thoughts in the context of recently discontinuing cannabis. This is the patient's first inpatient psychiatric admission. He mentions that he started experiencing depressive symptoms while in middle school, describing low moods, poor energy, poor interest in activities and concentration issues. He continued to have depressive symptoms over the years, and was tried on Prozac at one point, which he stopped due to it's intolerable side effects. He admits to using alcohol and marijuana in the past intermittently for his depressive mood and thoughts. He mentions that he broke up from his girlfriend last year in the summer, after which he experienced worsening depressive symptoms, and started utilizing marijuana as a means to cope with his depressive moods. He admits to using marijuana daily, about 7 grams in 2-3 days, usually in the form of bowls. He mentions that marijuana helps calm his mood, lowers his depression, helps his appetite, and helps him sleep. He last used cannabis about a week ago, and stopped cold turkey; the last 5-6 days being the longest he's been off cannabis since he started using daily a year ago. He mentions in the last week, he's experienced worsened depressive mood, irritability, insomnia, poor appetite, anxiety and anhedonia. He had an episode where he purposefully slammed his head multiple times on a wall, partly out of frustration for not being able to sleep, and partly wanting to literally knock himself out so he could sleep. He denies any current suicidal ideation, intent or plan, and is future oriented. He mentions various stressors, such as school " "debt, school work stress and relationship issues that often worsen his depression. He is willing to take medications, go to groups, and follow up with outpatient providers.         Past Psychiatric History:   Past inpatient treatment: None   Current outpatient psychiatrist: None   Current outpatient therapist: None  Other (ACT team etc): None   Past suicide attempts: Denies   History of commitments: None   History of ECT: None         Substance Use and History:     Since a year ago, he admits to using marijuana daily, about 7 grams in 2-3 days, usually in the form of bowls. Utox was positive to cannabis.         Past Medical History:   PAST MEDICAL HISTORY: History reviewed. No pertinent past medical history.    PAST SURGICAL HISTORY: History reviewed. No pertinent surgical history.          Family History:   FAMILY HISTORY: No family history on file.        Social History:   SOCIAL HISTORY:   Social History   Substance Use Topics     Smoking status: Never Smoker     Smokeless tobacco: Never Used     Alcohol use Yes            Physical ROS:   The patient endorsed anxiety, nausea at times.  The remainder of 10-point review of systems was negative except as noted in HPI.         PTA Medications:     No prescriptions prior to admission.          Allergies:   No Known Allergies       Labs:     Recent Results (from the past 48 hour(s))   Drug abuse screen 6 urine (tox)    Collection Time: 09/30/18  7:21 PM   Result Value Ref Range    Amphetamine Qual Urine Negative NEG^Negative    Barbiturates Qual Urine Negative NEG^Negative    Benzodiazepine Qual Urine Negative NEG^Negative    Cannabinoids Qual Urine Positive (A) NEG^Negative    Cocaine Qual Urine Negative NEG^Negative    Ethanol Qual Urine Negative NEG^Negative    Opiates Qualitative Urine Negative NEG^Negative          Physical and Psychiatric Examination:     /87  Pulse 73  Temp 96.7  F (35.9  C) (Tympanic)  Resp 16  Ht 1.854 m (6' 1\")  Wt 76.2 kg (168 " lb)  SpO2 95%  BMI 22.16 kg/m2  Weight is 168 lbs 0 oz  Body mass index is 22.16 kg/(m^2).    Physical Exam:  I have reviewed the physical exam as documented by ED provider and agree with findings and assessment and have no additional findings to add at this time.    Mental Status Exam:  Appearance:  male, disheveled. No acute distress.   Attitude:  Guarded at times, tense.   Eye Contact:  Continuous   Mood:  Depressive   Affect:  Blunted   Speech:  Soft in tone, slow in rate   Language: fluent and intact in English  Psychomotor, Gait, Musculoskeletal:  no evidence of tardive dyskinesia, dystonia, or tics  Throught Process:  logical and linear  Associations:  no loose associations  Thought Content:  no evidence of suicidal ideation or homicidal ideation, no evidence of psychotic thought, no auditory hallucinations present and no visual hallucinations present  Insight:  fair  Judgement:  fair  Oriented to:  time, person, and place  Attention Span and Concentration:  fair  Recent and Remote Memory:  fair  Fund of Knowledge:  appropriate         Admission Diagnoses:      Major depressive disorder, recurrent episode, moderate (H)  Cannabis Use Disorder   Cannabis Withdrawal   Generalized Anxiety Disorder            Assessment & Plan:     Assessment:  Patient appears to have ongoing depressive symptoms over the years which he attempted to cope with marijuana. His recent depression and anxiety exacerbation could be perpetuated to his cannabis withdrawal symptoms as well. I discussed the dangers of cannabis use in a developing young adults brain. The underlying problem is inadequate treatment of his anxiety and depression. I discussed adding on Remeron for his sleep and appetite (which later can be titrated to a more antidepressive dose), Melatonin to regulate his sleep/wake cycle, Buspar 10 mg BID for anxiety relief (but no high sedation like benzos). Patient was motivated to see a CD counselor for his cannabis  use.      Plan:  1.) Medication Plan:  - Remeron 7.5 mg HS  - Buspar 10 mg BID  - Melatonin 3 mg    Labs: CBC, BMP, TSH, Hepatic Panel     2.) Psychosocial Plan:   - Follow up with psychiatry and therapy, along with CD counselor (outpatient therapy resources) for cannabis use.     Legal:  Voluntary     Disposition:  Discharge in 2-3 days.        Danielito Cartagena MD  [facility]

## 2018-10-02 NOTE — PROGRESS NOTES
INITIAL OT ASSESSMENT     10/02/18 1200   Clinical Impression   Affect Appropriate to situation   Orientation Oriented to person, place and time   Appearance and ADLs General cleanliness observed in most areas   Attention to Internal Stimuli No observed signs   Interaction Skills Interacts appropriately with staff;Initiates appropriately with staff;Interacts appropriately with peers;Initiates appropriately with peers   Ability to Communicate Needs Independent   Verbal Content Articulate;Clear;Appropriate to topic   Ability to Maintain Boundaries Maintains appropriate physical boundaries;Maintains appropriate verbal boundaries   Participation Initiates participation;Independently participates   Concentration Concentrates 50 minutes   Ability to Concentrate Without difficulty   Follows and Comprehends Directions Independently follows multi-step directions   Memory Delayed and immediate recall intact   Organization Independently organizes all tasks   Decision Making Independent   Planning and Problem Solving Independently plans ahead   Ability to Apply and Learn Concepts Applies within group structure   Frustrations / Stress Tolerance Independently identifies skills    Level of Insight Some insight   Self Esteem Can identify positives;Takes risks with support and encouragement;Accepts positive feedback   Social Supports Has knowledge of support systems

## 2018-10-02 NOTE — PLAN OF CARE
"Problem: Mood Impairment (Depressive Signs/Symptoms) (Adult)  Goal: Improved Mood Symptoms (Depressive Signs/Symptoms)  1) Pt will remain safe without any self harm during hospitalization.  2) Patient will have decreased thoughts of self harm and/or suicidal ideation  3) Patient will report improved sleep and feeling rested upon waking.  4) Patient will have adequate daily oral intake.  5) Patient will have improvement in self-care, including personal hygiene.  6) Patient will verbalize and demonstrate an ability to cope for their age.  7) Patient will be able to participate and initiate activities and conversation with others.  8) Patient will discuss feelings of hopelessness and and express an interest in the future.  9) By discharge the patient will report an increase in sense of control over their current situation.(i.e by verbalizing coping techniques to help get their life back on track and/or naming people they can talk to when they need emotional assistance).      Pt was visible in the lounge watching TV with his peers for most of the shift.  Pt reports coming to the hospital due to \" marijuana withdrawal\" after he stopped it \" cold turkey.\" Pt had a depressed mood and blunted affect. Was cooperative and polite. He rated his depression 6/10 and anxiety 4/10.  Pt denied SI/ SIB and AH. Reported feeling safe and hopeful. Is hopeful about his scheduled antidepressants. Reports his concentration as \" good.\" Reported his appetite and sleep as adequate. No physical concerns reported or observed. Pt reports using  Reading as his coping skills. No medication side effects.   Plan: Status 15s; Build trust with pt. Continue to build on strengths. Encourage healthy coping.           "

## 2018-10-02 NOTE — PLAN OF CARE
Problem: Occupational Therapy Goals (Adult)  Goal: Occupational Therapy Goals  Pt will independently identify several coping skills to utilize to increase self-management upon discharge within 1 week.        10/02/18 1453   Occupational Therapy   Type of Intervention structured groups   Response Initiates, socially acceptable   Hours 3     Pt attended 3 out of 3 occupational therapy groups this date. Pt actively participated in occupational therapy clinic. Pt was able to ask for assistance as needed, and independently initiate a familiar, goal-directed task after initial orientation to clinic materials was provided by writer. Pt demonstrated good focus, planning, and problem solving during task completion. Pt appeared comfortable interacting with peers and writer, and socialized throughout clinic. Pt actively participated in an occupational therapy group with a focus on social and leisure engagement. Pt was able to follow 3 step directions, and concentrated for the full duration of group. Pt appeared to brighten with social interaction. Pt actively participated in a mental health management group with a focus on coping through movement to facilitate relaxation and stress management via chair yoga. Pt followed and engaged in the yoga poses, and verbalized feeling calmer at the end of group. Pt was pleasant and engaged with a bright affect during OT groups this date.     OT Self-Assessment  Pt was given and completed a written self-assessment form. OT staff reviewed with pt and explained the value of having them involved in their treatment plan, and provided options to meet current needs/self-identified goals.     Pt did not identify any stressors/events that led to hospitalization.    Pt identified the following symptoms that they are currently dealing with:   Emotions: sadness, anxiety or fear, feeling abandoned, feeling numb, feeling depressed, guilt, and feeling overwhelmed  Thoughts: negative thoughts, racing  "thoughts, memory problems, trouble concentrating, and self-harm or suicidal thoughts  Behaviors: self-harming actions, withdrawal, increased smoking/alcohol/drug use, restless behaviors, budget or money concerns, trouble using or finding a support system, and procrastinating     Self-identified coping skills: \"music, being with friends, video games\"  Self-identified social supports: \"family, friends, girlfriend\"  Self-identified personal strengths: \"honesty, intelligent/understanding, communicating\"    Goals: \"identify and express my feelings in a better way, look at how my self-destructive behavior affects me (or others), find resources and support, and wok on accepting \"my depression won't go away if I avoid it\"          "

## 2018-10-02 NOTE — PROGRESS NOTES
"Pt was present in the milieu, social with others, and attending groups throughout the day. He showered, ate meals, and completed cross words. He denies SI and SIB at the moment but shares he is experiencing both depression and anxiety, rating both at a 5 out of 10 with 10 being the worse. He rates his sleep and appetite as \"good\" and denies pain. He shares a little on his admission into the hospital and appears to have good insight on his current functioning.       10/02/18 1453   Behavioral Health   Hallucinations denies / not responding to hallucinations   Thinking intact   Orientation person: oriented;place: oriented;date: oriented;time: oriented   Memory baseline memory   Insight admits / accepts   Judgement impaired   Eye Contact at examiner   Affect full range affect   Mood mood is calm   Physical Appearance/Attire neat;appears stated age   Hygiene well groomed   Suicidality other (see comments)  (denies)   1. Wish to be Dead No   2. Non-Specific Active Suicidal Thoughts  No   Self Injury other (see comment)  (denies)   Elopement (none stated or observed)   Activity other (see comment)  (present in milieu, social with others)   Speech clear;coherent   Psychomotor / Gait balanced;steady   Activities of Daily Living   Hygiene/Grooming independent   Oral Hygiene independent   Dress scrubs (behavioral health)   Laundry with supervision   Room Organization independent     "

## 2018-10-02 NOTE — PROGRESS NOTES
"Met with the patient to discuss treatment planning.  There is a full service counseling and health service on the campus of  [facility] which he can access.  His mother is apparently also looking for \"in-network\" providers under their insurance since they live in [place] and  is considered out of state. He reports feeling better today and has been attending all programming. Cooperative with care.  "

## 2018-10-03 PROCEDURE — H2032 ACTIVITY THERAPY, PER 15 MIN: HCPCS

## 2018-10-03 PROCEDURE — 25000132 ZZH RX MED GY IP 250 OP 250 PS 637: Performed by: EMERGENCY MEDICINE

## 2018-10-03 PROCEDURE — 90853 GROUP PSYCHOTHERAPY: CPT

## 2018-10-03 PROCEDURE — G0177 OPPS/PHP; TRAIN & EDUC SERV: HCPCS

## 2018-10-03 PROCEDURE — 25000132 ZZH RX MED GY IP 250 OP 250 PS 637: Performed by: PSYCHIATRY & NEUROLOGY

## 2018-10-03 PROCEDURE — 12400007 ZZH R&B MH INTERMEDIATE UMMC

## 2018-10-03 RX ADMIN — MIRTAZAPINE 15 MG: 15 TABLET, FILM COATED ORAL at 20:50

## 2018-10-03 RX ADMIN — BUSPIRONE HYDROCHLORIDE 10 MG: 10 TABLET ORAL at 08:52

## 2018-10-03 RX ADMIN — HYDROXYZINE HYDROCHLORIDE 25 MG: 25 TABLET ORAL at 20:50

## 2018-10-03 RX ADMIN — BUSPIRONE HYDROCHLORIDE 10 MG: 10 TABLET ORAL at 20:50

## 2018-10-03 RX ADMIN — MELATONIN TAB 3 MG 3 MG: 3 TAB at 20:50

## 2018-10-03 ASSESSMENT — ACTIVITIES OF DAILY LIVING (ADL)
LAUNDRY: WITH SUPERVISION
GROOMING: INDEPENDENT
DRESS: SCRUBS (BEHAVIORAL HEALTH)
ORAL_HYGIENE: INDEPENDENT

## 2018-10-03 NOTE — PROGRESS NOTES
Pt actively involved in organizing and expressive movement during dance/movement therapy (DMT). Pt initiated group processes and offered support in socially-appropriate ways.Pt expressed insight into tools he could use for his mental health.

## 2018-10-03 NOTE — PLAN OF CARE
Problem: Mood Impairment (Depressive Signs/Symptoms) (Adult)  Goal: Improved Mood Symptoms (Depressive Signs/Symptoms)  1) Pt will remain safe without any self harm during hospitalization.  2) Patient will have decreased thoughts of self harm and/or suicidal ideation  3) Patient will report improved sleep and feeling rested upon waking.  4) Patient will have adequate daily oral intake.  5) Patient will have improvement in self-care, including personal hygiene.  6) Patient will verbalize and demonstrate an ability to cope for their age.  7) Patient will be able to participate and initiate activities and conversation with others.  8) Patient will discuss feelings of hopelessness and and express an interest in the future.  9) By discharge the patient will report an increase in sense of control over their current situation.(i.e by verbalizing coping techniques to help get their life back on track and/or naming people they can talk to when they need emotional assistance).    Outcome: Improving    RN ASSESSMENT    Pt visible in milieu. Social with peers. Pleasant, cooperative, appropriate with staff. Pt reports to writer that his depression has improved since admission. Reports severity on admission was 8/10 and is currently 5/10. Similarly, anxiety has dropped to 4/10. Denies SI/SIB currently. States he is hopeful newly prescribed medications will continue to help his mood and sleep. Hopeful that symptoms related to marijuana withdrawal syndrome will pass eventually. Verbalized understanding of plan to increase Remeron tonight to 15 mg. States sleep has been good since arrival on unit due to Trazodone he received Sunday night and Remeron last night. Affect WDL. Hygiene good. Reports good appetite. Med-compliant. Continue with current treatment plan and recommendations. Continue to monitor and reassess symptoms. Monitor response to medications. Monitor progress towards treatment goals. Encourage groups and  participation.

## 2018-10-04 PROBLEM — F12.93 CANNABIS WITHDRAWAL (H): Status: ACTIVE | Noted: 2018-10-04

## 2018-10-04 PROBLEM — F12.93 CANNABIS WITHDRAWAL (H): Status: RESOLVED | Noted: 2018-10-04 | Resolved: 2018-10-04

## 2018-10-04 PROBLEM — F41.1 GAD (GENERALIZED ANXIETY DISORDER): Status: ACTIVE | Noted: 2018-10-04

## 2018-10-04 PROBLEM — F12.20 CANNABIS USE DISORDER, SEVERE, DEPENDENCE (H): Status: ACTIVE | Noted: 2018-10-04

## 2018-10-04 PROBLEM — F33.41 RECURRENT MAJOR DEPRESSIVE DISORDER, IN PARTIAL REMISSION (H): Status: ACTIVE | Noted: 2018-10-04

## 2018-10-04 PROCEDURE — 97127 ZZHC OT THERAPEUTIC INTERVENTION W/FOCUS ON COGNITIVE FUNCTION,EA 15 MIN: CPT | Mod: GO

## 2018-10-04 PROCEDURE — G0177 OPPS/PHP; TRAIN & EDUC SERV: HCPCS

## 2018-10-04 PROCEDURE — 99231 SBSQ HOSP IP/OBS SF/LOW 25: CPT | Performed by: PSYCHIATRY & NEUROLOGY

## 2018-10-04 PROCEDURE — 25000132 ZZH RX MED GY IP 250 OP 250 PS 637: Performed by: PSYCHIATRY & NEUROLOGY

## 2018-10-04 PROCEDURE — 25000132 ZZH RX MED GY IP 250 OP 250 PS 637: Performed by: EMERGENCY MEDICINE

## 2018-10-04 PROCEDURE — 12400007 ZZH R&B MH INTERMEDIATE UMMC

## 2018-10-04 RX ORDER — MIRTAZAPINE 15 MG/1
15 TABLET, FILM COATED ORAL AT BEDTIME
Qty: 30 TABLET | Refills: 1 | Status: SHIPPED | OUTPATIENT
Start: 2018-10-04

## 2018-10-04 RX ORDER — HYDROXYZINE HYDROCHLORIDE 25 MG/1
25 TABLET, FILM COATED ORAL EVERY 4 HOURS PRN
Qty: 30 TABLET | Refills: 1 | Status: SHIPPED | OUTPATIENT
Start: 2018-10-04

## 2018-10-04 RX ORDER — LANOLIN ALCOHOL/MO/W.PET/CERES
3 CREAM (GRAM) TOPICAL AT BEDTIME
Qty: 30 TABLET | Refills: 1 | Status: SHIPPED | OUTPATIENT
Start: 2018-10-04

## 2018-10-04 RX ORDER — BUSPIRONE HYDROCHLORIDE 10 MG/1
10 TABLET ORAL 2 TIMES DAILY
Qty: 60 TABLET | Refills: 1 | Status: SHIPPED | OUTPATIENT
Start: 2018-10-04

## 2018-10-04 RX ORDER — TRAZODONE HYDROCHLORIDE 50 MG/1
50 TABLET, FILM COATED ORAL
Qty: 15 TABLET | Refills: 1 | Status: SHIPPED | OUTPATIENT
Start: 2018-10-04

## 2018-10-04 RX ADMIN — MELATONIN TAB 3 MG 3 MG: 3 TAB at 21:23

## 2018-10-04 RX ADMIN — MIRTAZAPINE 15 MG: 15 TABLET, FILM COATED ORAL at 21:23

## 2018-10-04 RX ADMIN — BUSPIRONE HYDROCHLORIDE 10 MG: 10 TABLET ORAL at 08:41

## 2018-10-04 RX ADMIN — BUSPIRONE HYDROCHLORIDE 10 MG: 10 TABLET ORAL at 21:23

## 2018-10-04 RX ADMIN — HYDROXYZINE HYDROCHLORIDE 25 MG: 25 TABLET ORAL at 21:23

## 2018-10-04 ASSESSMENT — ACTIVITIES OF DAILY LIVING (ADL)
GROOMING: INDEPENDENT
DRESS: INDEPENDENT;SCRUBS (BEHAVIORAL HEALTH)
ORAL_HYGIENE: INDEPENDENT
LAUNDRY: WITH SUPERVISION
GROOMING: INDEPENDENT
DRESS: INDEPENDENT
ORAL_HYGIENE: INDEPENDENT

## 2018-10-04 NOTE — PROGRESS NOTES
"Pt was visible in the lounge for most of the shift. He was social with peers and polite and cooperative with staff. Pt's overall affect was full ranged, his mood was calm. Pt denied all mental health symptoms as well as SI and SIB. Pt did state his concentration has been effected since starting new medications, he stated \"I feel spacey at times, like I realize all of a sudden that I have lost track of the conversation and have to focus to get back into it\". When asked about appetite pt stated \"it's very much improved, which is a really good thing since I wasn't eating or drinking anything last week\". Pt also stated \"I'm really happy I'm here and getting the help I need. I finally took that step to help myself and I'm really grateful I did it\". Pt had 3 visitors this evening, his parents and his girlfriend. He stated the visit went well. Pt expressed one concern and that is he would like to talk to the  tomorrow about finding a therapist as well as an outside psychiatrist to help with his medications. Overall pt had a good shift and there were no major concerns.        10/03/18 2214   Behavioral Health   Hallucinations denies / not responding to hallucinations   Thinking intact   Orientation person: oriented;place: oriented;date: oriented;time: oriented   Insight admits / accepts   Judgement intact   Eye Contact at examiner   Affect full range affect   Mood mood is calm   Physical Appearance/Attire appears stated age;neat   Hygiene well groomed   Suicidality (Denies)   1. Wish to be Dead No   2. Non-Specific Active Suicidal Thoughts  No   Self Injury (Denies)   Elopement (None observed)   Activity other (see comment)  (Visible in the milieu, social with peers)   Speech clear;coherent   Psychomotor / Gait balanced;steady   Activities of Daily Living   Hygiene/Grooming independent   Oral Hygiene independent   Dress scrubs (behavioral health)   Laundry with supervision   Room Organization independent     "

## 2018-10-04 NOTE — PLAN OF CARE
"Problem: Occupational Therapy Goals (Adult)  Goal: Occupational Therapy Goals  Pt will independently identify several coping skills to utilize to increase self-management upon discharge within 1 week.         10/04/18 1400   Occupational Therapy   Type of Intervention structured groups   Response Initiates, socially acceptable   Hours 3     Pt attended 3 out of 3 occupational therapy groups this date. Pt actively participated in a mental health management group involving identification of coping skills beginning with every letter of the alphabet facilitated through group discussion. Pt consistently contributed ideas of positive coping skills, and was receptive and respectful of ideas contributed by peers. Pt identified coping skills including \"humor and music\" that he currently is successful in utilizing, and \"asking for help, exercising, and going outside/occupying self during the winter months\" as coping skills he would like to practice using. Pt actively participated in occupational therapy clinic. Pt was able to ask for assistance as needed, and independently initiate a novel, creative expression task without difficulty. Pt demonstrated good focus, planning, and problem solving during task completion. Pt appeared comfortable interacting with peers and writer, and socialized appropriately on occasion during clinic. Pt attended an OT group with a focus on a social leisure task. Pt was able to follow 2 step directions of the novel task and remained focused for full duration. Pt's affect appeared to brighten throughout group, evident in frequent laughter with peers. Pt was pleasant and engaged with a congruent affect this date.         "

## 2018-10-04 NOTE — PROGRESS NOTES
Met with the patient to discuss outpatient treatment planning again.  He is a [place] resident attending college at [employer].  He has never used the Student Health Service or the Counseling Service available to students.  He was provided with this resource and an additional mental health clinic in [place] - [facility].  This writer spoke with [facility] and they are not taking new patients.

## 2018-10-04 NOTE — PROGRESS NOTES
"[facility], [place]   Psychiatric Progress Note        Interim History:   Reason for Hospitalization:Logan is a 23 year old male with history of depression and cannabis use disorder is admitted on a voluntary basis for worsening depressive thoughts in the context of recently discontinuing cannabis.     Subjective: \"Definitely feeling better\"     As per today's interview: Patient as compliant with medications, denied intolerable s/e. Felt subjective improvement in thoughts and mood. Improved sleep at night.  Attending groups. Social with peers. Denies SI, intent or plan. Future oriented.          Medications:       busPIRone  10 mg Oral BID     melatonin  3 mg Oral At Bedtime     mirtazapine  15 mg Oral At Bedtime          Allergies:   No Known Allergies       Labs:     No results found for this or any previous visit (from the past 48 hour(s)).       Psychiatric Examination:   /87  Pulse 73  Temp 97.8  F (36.6  C) (Oral)  Resp 16  Ht 1.854 m (6' 1\")  Wt 77.2 kg (170 lb 3.2 oz)  SpO2 95%  BMI 22.46 kg/m2  Weight is 170 lbs 3.2 oz  Body mass index is 22.46 kg/(m^2).    Appearance:  male. Improved hygiene/grooming. No acute distress.   Attitude: pleasant, cooperative   Eye Contact:  Continuous   Mood:  \"Feeling better\"   Affect:  Shows appropriate range.    Speech:  Soft in tone, slow in rate   Language: fluent and intact in English  Psychomotor, Gait, Musculoskeletal:  no evidence of tardive dyskinesia, dystonia, or tics  Throught Process:  logical and linear  Associations:  no loose associations  Thought Content:  no evidence of suicidal ideation or homicidal ideation, no evidence of psychotic thought, no auditory hallucinations present and no visual hallucinations present  Insight:  fair  Judgement:  fair  Oriented to:  time, person, and place  Attention Span and Concentration:  fair  Recent and Remote Memory:  fair  Fund of Knowledge:  appropriate      Assessment " & Plan       Principal Diagnosis:   Major depressive disorder, recurrent episode, moderate (H)  Cannabis Use Disorder   Cannabis Withdrawal   Generalized Anxiety Disorder     Assessment:   (Initial Assessment 10/1): Patient appears to have ongoing depressive symptoms over the years which he attempted to cope with marijuana. His recent depression and anxiety exacerbation could be perpetuated to his cannabis withdrawal symptoms as well. I discussed the dangers of cannabis use in a developing young adults brain. The underlying problem is inadequate treatment of his anxiety and depression. I discussed adding on Remeron for his sleep and appetite (which later can be titrated to a more antidepressive dose), Melatonin to regulate his sleep/wake cycle, Buspar 10 mg BID for anxiety relief (but no high sedation like benzos). Patient was motivated to see a CD counselor for his cannabis use.      -----------    (10/2): Improved subjective mood, and relative improvements in observable affect. Medication compliant, no intolerable effects. Attending groups. Open to future outpatient treatment. Sleep has improved.     (10/4): Exhibits continued improvement in mood, thoughts, sleep and appetite. Objectively, affect and conversational interaction has also made improvements. Med compliant, and attending groups. Plan for discharge tomorrow.     Plan:  - Remeron 15 mg HS  - Buspar 10 mg BID  - Melatonin 3 mg         Legal Status: Voluntary    Safety Assessment:   Checks: Status 15  Precautions: Suicide  Self-harm  Pt has not required locked seclusion or restraints in the past 24 hours to maintain safety, please refer to RN documentation for further details.       The risks, benefits, alternatives and side effects have been discussed and are understood by the patient and other caregivers.       Anticipated Disposition/Discharge Date: Discharge tomorrow     Attestation:  Patient has been seen and evaluated by me,  Danielito Cartagena MD

## 2018-10-04 NOTE — DISCHARGE INSTRUCTIONS
Behavioral Discharge Planning and Instructions      Summary:  You were admitted on 9/30/2018  due to suicidal ideation and substance abuse..  You were treated by Dr Danielito Cartagena MD and discharged on 10/5/18 from [address]  to [facility].    Principal Diagnosis: Major Depressive Disorder, Recurrent, Severe; Marijuana Abuse Disorder    Health Care Follow-up Appointments: (Patient to set up appointments after discharge.)  [facility] (Student Health Services for medication management)  [address] [place] [address]  [phone]    Counseling Services (Therapists available)  [address] [place] [address]  [phone]  [place] [address]  [phone]    Additional Resources in [place]:  Outpatient [facility]  *Ask for new patient coordinator  [address] [place] [address]  [phone]    [facility] is not accepting new patients at this time.      Attend all scheduled appointments with your outpatient providers. Call at least 24 hours in advance if you need to reschedule an appointment to ensure continued access to your outpatient providers.   Major Treatments, Procedures and Findings:  You were provided with: a psychiatric assessment, assessed for medical stability, medication evaluation and/or management and group therapy    Symptoms to Report: thoughts of suicide    Early warning signs can include: increased depression or anxiety sleep disturbances increased thoughts or behaviors of suicide or self-harm     Safety and Wellness:  Take all medicines as directed.  Make no changes unless your doctor suggests them.      Follow treatment recommendations.  Refrain from alcohol and non-prescribed drugs.  If there is a concern for safety, call 911.    Resources:   Crisis Intervention: [phone] or 968-988-2006 (TTY: [phone]).  Call anytime for help.  [employer] [place] on  Mental Illness (www.mn.girish.org): 226-105-7098 or 181-907-0584.    The treatment team has appreciated the opportunity to work with you.     If you have any questions or concerns our unit number is [phone].

## 2018-10-04 NOTE — PLAN OF CARE
"Problem: Cognitive Impairment (Depressive Signs/Symptoms) (Adult)  Intervention: Support/Promote Cognitive Ability  RN48/   Pt reports \"ready for discharge\" from hospital; visible and participating in groups, denies any SI/SIB or psychotic symptoms; states his appetite and sleep are improved; denies any concerns with medications.    VS reviewed: /87  Pulse 73  Temp 97.8  F (36.6  C) (Oral)  Resp 16  Ht 1.854 m (6' 1\")  Wt 77.2 kg (170 lb 3.2 oz)  SpO2 95%  BMI 22.46 kg/m2 . Patient denies  pain.    Patient evaluation continues. Assessed mood,anxiety,thoughts and behavior. Patient is progressing towards goals. Patient is encouraged to participate in groups and assisted to develop healthy coping skills.     Length of stay: 3    Refer to daily team meeting notes for individualized plan of care. Nursing will continue to assess.            "

## 2018-10-05 VITALS
SYSTOLIC BLOOD PRESSURE: 145 MMHG | DIASTOLIC BLOOD PRESSURE: 87 MMHG | TEMPERATURE: 97.4 F | OXYGEN SATURATION: 95 % | BODY MASS INDEX: 22.56 KG/M2 | HEART RATE: 73 BPM | HEIGHT: 73 IN | WEIGHT: 170.2 LBS | RESPIRATION RATE: 16 BRPM

## 2018-10-05 PROCEDURE — 25000132 ZZH RX MED GY IP 250 OP 250 PS 637: Performed by: PSYCHIATRY & NEUROLOGY

## 2018-10-05 PROCEDURE — 99238 HOSP IP/OBS DSCHRG MGMT 30/<: CPT | Performed by: PSYCHIATRY & NEUROLOGY

## 2018-10-05 PROCEDURE — G0177 OPPS/PHP; TRAIN & EDUC SERV: HCPCS

## 2018-10-05 RX ADMIN — BUSPIRONE HYDROCHLORIDE 10 MG: 10 TABLET ORAL at 08:38

## 2018-10-05 NOTE — PROGRESS NOTES
Patient had a good shift.  Calm and cooperative.  Denies all mental health symptoms.  Concentration is fair.  Good visit from Family.  Knows he needs to be here and hopeful for the future. Rates depression and Anxiety at a 2.  I little anx. about his lost belongings.  No other concerns at this time.  Would like to talk to Dr Cartagena before he discharges tomorrow

## 2018-10-05 NOTE — PROGRESS NOTES
10/05/18 1500   Occupational Therapy   Type of Intervention structured groups   Response Initiates, socially acceptable   Hours 1

## 2018-10-05 NOTE — PLAN OF CARE
"Problem: Occupational Therapy Goals (Adult)  Goal: Occupational Therapy Goals  Pt will independently identify several coping skills to utilize to increase self-management upon discharge within 1 week.        Pt actively participated in a structured occupational therapy group involving a self-reflecting, group leisure task. Pt appeared comfortable sharing positive past experiences and personal information with peers, and was respectful in listening and responding to peers. When prompted to share about something he did that was out of character, he explained \"coming to the hospital was out of character for me; I usually like to handle things on my own, but I'm really glad I got help.\" Appropriately encouraged group members when it was their turn to share. Future-oriented in sharing about upcoming travel plans. Pleasant and cooperative.        "

## 2018-10-05 NOTE — PLAN OF CARE
Problem: Mood Impairment (Depressive Signs/Symptoms) (Adult)  Goal: Improved Mood Symptoms (Depressive Signs/Symptoms)  1) Pt will remain safe without any self harm during hospitalization.  2) Patient will have decreased thoughts of self harm and/or suicidal ideation  3) Patient will report improved sleep and feeling rested upon waking.  4) Patient will have adequate daily oral intake.  5) Patient will have improvement in self-care, including personal hygiene.  6) Patient will verbalize and demonstrate an ability to cope for their age.  7) Patient will be able to participate and initiate activities and conversation with others.  8) Patient will discuss feelings of hopelessness and and express an interest in the future.  9) By discharge the patient will report an increase in sense of control over their current situation.(i.e by verbalizing coping techniques to help get their life back on track and/or naming people they can talk to when they need emotional assistance).    Outcome: Adequate for Discharge Date Met: 10/05/18  Pt was escorted off the unit to parent.  Pt verbalized readiness for discharge. Pt verbalized understanding of discharge plan including discharge medications and follow up appointments. Many personal items, handwritten on belongings sheet were also sent with pt. Pt will be returning to his parents house then going back to college in [place] on Tuesday.  Pt denies SI and SIB. Pt left with personal belongings. Medication complaint. Full range affect.

## 2018-10-30 NOTE — DISCHARGE SUMMARY
[facility], [place]   Psychiatric Discharge Summary      Logan Drake MRN# [id number]   Age: 23 year old YOB: 1995     Date of Admission:  9/30/2018  Date of Discharge:  10/05/18  Admitting Physician:  Danielito Cartagena MD  Discharge Physician:  Danielito Cartagena MD         Summary/Hospital Course/Disposition:   Reason for Hospitalization: Logan is a 23 year old male with history of depression and cannabis use disorder is admitted on a voluntary basis for worsening depressive thoughts in the context of recently discontinuing cannabis.       Hospital Course:   (Initial Assessment 10/1): Patient appears to have ongoing depressive symptoms over the years which he attempted to cope with marijuana. His recent depression and anxiety exacerbation could be perpetuated to his cannabis withdrawal symptoms as well. I discussed the dangers of cannabis use in a developing young adults brain. The underlying problem is inadequate treatment of his anxiety and depression. I discussed adding on Remeron for his sleep and appetite (which later can be titrated to a more antidepressive dose), Melatonin to regulate his sleep/wake cycle, Buspar 10 mg BID for anxiety relief (but no high sedation like benzos). Patient was motivated to see a CD counselor for his cannabis use.       -----------     (10/2): Improved subjective mood, and relative improvements in observable affect. Medication compliant, no intolerable effects. Attending groups. Open to future outpatient treatment. Sleep has improved.      (10/4): Exhibits continued improvement in mood, thoughts, sleep and appetite. Objectively, affect and conversational interaction has also made improvements. Med compliant, and attending groups. Plan for discharge tomorrow.     Medication Additions:  - Remeron 15 mg HS  - Buspar 10 mg BID  - Melatonin 3 mg            DIagnoses:   Major depressive disorder, recurrent episode, moderate  (H)  Cannabis Use Disorder   Cannabis Withdrawal   Generalized Anxiety Disorder          Labs:   No results found for this or any previous visit (from the past 24 hour(s)).         Consults:   None            Discharge Medications:        Review of your medicines      START taking       Dose / Directions    busPIRone 10 MG tablet   Commonly known as:  BUSPAR   Used for:  Major depressive disorder, recurrent episode, moderate (H), TREVER (generalized anxiety disorder)        Dose:  10 mg   Take 1 tablet (10 mg) by mouth 2 times daily   Quantity:  60 tablet   Refills:  1       hydrOXYzine 25 MG tablet   Commonly known as:  ATARAX   Used for:  TREVER (generalized anxiety disorder)        Dose:  25 mg   Take 1 tablet (25 mg) by mouth every 4 hours as needed for anxiety   Quantity:  30 tablet   Refills:  1       melatonin 3 MG tablet   Used for:  Major depressive disorder, recurrent episode, moderate (H)        Dose:  3 mg   Take 1 tablet (3 mg) by mouth At Bedtime   Quantity:  30 tablet   Refills:  1       mirtazapine 15 MG tablet   Commonly known as:  REMERON   Used for:  Major depressive disorder, recurrent episode, moderate (H), TREVER (generalized anxiety disorder)        Dose:  15 mg   Take 1 tablet (15 mg) by mouth At Bedtime   Quantity:  30 tablet   Refills:  1       traZODone 50 MG tablet   Commonly known as:  DESYREL   Used for:  Major depressive disorder, recurrent episode, moderate (H), TREVER (generalized anxiety disorder), Cannabis withdrawal        Dose:  50 mg   Take 1 tablet (50 mg) by mouth nightly as needed for sleep   Quantity:  15 tablet   Refills:  1            Where to get your medicines      These medications were sent to [facility] [place], [place] - [address]  [address] [address]     Phone:  [phone]      busPIRone 10 MG tablet     hydrOXYzine 25 MG tablet     melatonin 3 MG tablet     mirtazapine 15 MG tablet     traZODone 50 MG tablet                   "Mental Status Examination:   Appearance:  male. Improved hygiene/grooming. No acute distress.   Attitude: pleasant, cooperative   Eye Contact:  Continuous   Mood:  \"Feeling better\"   Affect:  Shows appropriate range.    Speech:  Soft in tone, slow in rate   Language: fluent and intact in English  Psychomotor, Gait, Musculoskeletal:  no evidence of tardive dyskinesia, dystonia, or tics  Throught Process:  logical and linear  Associations:  no loose associations  Thought Content:  no evidence of suicidal ideation or homicidal ideation, no evidence of psychotic thought, no auditory hallucinations present and no visual hallucinations present  Insight:  fair  Judgement:  fair  Oriented to:  time, person, and place  Attention Span and Concentration:  fair  Recent and Remote Memory:  fair  Fund of Knowledge:  appropriate         Discharge Plan:   No discharge procedures on file.     - Discharged with above described medications  - He was provided with Counseling Service information and an additional mental health clinic in [place] - [facility].  This writer spoke with [facility] and they are not taking new patients.    Danielito Cartagena MD  [facility]    "

## 2021-09-16 ENCOUNTER — E-VISIT (OUTPATIENT)
Dept: FAMILY MEDICINE | Facility: CLINIC | Age: 26
End: 2021-09-16
Payer: COMMERCIAL

## 2021-09-16 DIAGNOSIS — Z20.822 CLOSE EXPOSURE TO 2019 NOVEL CORONAVIRUS: Primary | ICD-10-CM

## 2021-09-16 PROCEDURE — 99421 OL DIG E/M SVC 5-10 MIN: CPT | Performed by: NURSE PRACTITIONER

## 2021-09-17 ENCOUNTER — LAB (OUTPATIENT)
Dept: LAB | Facility: CLINIC | Age: 26
End: 2021-09-17
Attending: NURSE PRACTITIONER
Payer: COMMERCIAL

## 2021-09-17 DIAGNOSIS — Z20.822 CLOSE EXPOSURE TO 2019 NOVEL CORONAVIRUS: ICD-10-CM

## 2021-09-17 PROCEDURE — U0005 INFEC AGEN DETEC AMPLI PROBE: HCPCS

## 2021-09-17 PROCEDURE — U0003 INFECTIOUS AGENT DETECTION BY NUCLEIC ACID (DNA OR RNA); SEVERE ACUTE RESPIRATORY SYNDROME CORONAVIRUS 2 (SARS-COV-2) (CORONAVIRUS DISEASE [COVID-19]), AMPLIFIED PROBE TECHNIQUE, MAKING USE OF HIGH THROUGHPUT TECHNOLOGIES AS DESCRIBED BY CMS-2020-01-R: HCPCS

## 2021-09-17 NOTE — PATIENT INSTRUCTIONS
"  Dear Logan Drake,    Based on your exposure to COVID-19 (coronavirus), we would like to test you for this virus. I have placed an order for this test.The best time for testing is 5-7 days after the exposure.    How to schedule:  Go to your [employer] home page and scroll down to the section that says  You have an appointment that needs to be scheduled  and click the large green button that says  Schedule Now  and follow the steps to find the next available opening.     If you are unable to complete these [employer] scheduling steps, please call [phone] to schedule your testing.     Return to work/school/ guidance:   For people with high risk exposures outside the home    Please let your workplace manager and staffing office know when your quarantine ends.     We can not give you an exact date as it depends on the information below. You can calculate this on your own or work with your manager/staffing office to calculate this. (For example if you were exposed on 10/4, you would have to quarantine for 14 full days. That would be through 10/18. You could return on 10/19.)    Quarantine Guidelines:  Patients (\"contacts\") who have been in close prolonged contact of an infected person(s) (within six feet for at least 15 minutes within a 24 hour period), and remain asymptomatic should enter quarantine based on the following options:    14-day quarantine period (this remains the CDC recommendation for the greatest protection against spread of COVID-19) OR    Minimum 7-day quarantine with negative RT-PCR test collected on day 5 or later OR    10-day quarantine with no test  Quarantine Guideline exceptions are as follows:    People who have been fully vaccinated do not need to quarantine if the exposure was at least 2 weeks after the last vaccination. This includes vaccinated health care workers.    Not fully vaccinated and unvaccinated Individuals who work in health care, congregate care, or congregate living " should be off work for 14 days from their last date of exposure. Community activities for this group can be resumed based on options above. Fully vaccinated individuals in this group do not need to quarantine from work after exposure.    Not fully vaccinated and unvaccinated people whose high-risk exposure was a household member should always quarantine for 14 days from their last date of exposure. Fully vaccinated people in this category do not need to quarantine.    Not fully vaccinated or unvaccinated residents of congregate care and congregate living settings should always quarantine for 14 days from their last date of exposure. Fully vaccinated residents do not need to quarantine.  Note: If you have ongoing exposure to the covid positive person, this quarantine period may be more than 14 days. (For example, if you are continued to be exposed to your child who tested positive and cannot isolate from them, then the quarantine of 7-14 days can't start until your child is no longer contagious. This is typically 10 days from onset of the child's symptoms. So the total duration may be 17-24 days in this case.)    You should continue symptom monitoring until day 14 post-exposure. If you develop signs or symptoms of COVID-19, isolate and get tested (even if you have been tested already).    How to quarantine:   Stay home and away from others. Don't go to school or anywhere else. Generally quarantine means staying home from work but there are some exceptions to this. Please contact your workplace.  No hugging, kissing or shaking hands.  Don't let anyone visit.  Cover your mouth and nose with a mask, tissue or washcloth to avoid spreading germs.  Wash your hands and face often. Use soap and water.    What are the symptoms of COVID-19?  The most common symptoms are cough, fever and trouble breathing. Less common symptoms include headache, body aches, fatigue (feeling very tired), chills, sore throat, stuffy or runny nose,  diarrhea (loose poop), loss of taste or smell, belly pain, and nausea or vomiting (feeling sick to your stomach or throwing up).  After 14 days, if you have still don't have symptoms, you likely don't have this virus.  If you develop symptoms, follow these guidelines.  If you're normally healthy: Please start another eVisit.  If you have a serious health problem (like cancer, heart failure, an organ transplant or kidney disease): Call your specialty clinic. Let them know that you might have COVID-19.    Where can I get more information?  [facility] - About COVID-19: [email or web address]  CDC - What to Do If You're Sick: www.cdc.gov/coronavirus/2019-ncov/about/steps-when-sick.html  CDC - Ending Home Isolation: www.cdc.gov/coronavirus/2019-ncov/hcp/disposition-in-home-patients.html  CDC - Caring for Someone: www.cdc.gov/coronavirus/2019-ncov/if-you-are-sick/care-for-someone.html  [facility] clinical trials (COVID-19 research studies): [email or web address]  Below are the COVID-19 hotlines at the [place] [facility] ([facility]). Interpreters are available.  For health questions: Call 228-599-8363 or 1-891.765.3035 (7 a.m. to 7 p.m.)  For questions about schools and childcare: Call 985-767-7234 or 1-315.312.5058 (7 a.m. to 7 p.m.)

## 2021-09-18 LAB — SARS-COV-2 RNA RESP QL NAA+PROBE: NEGATIVE

## 2021-10-17 ENCOUNTER — HEALTH MAINTENANCE LETTER (OUTPATIENT)
Age: 26
End: 2021-10-17

## 2022-11-19 ENCOUNTER — HEALTH MAINTENANCE LETTER (OUTPATIENT)
Age: 27
End: 2022-11-19

## 2023-08-22 ENCOUNTER — NURSE TRIAGE (OUTPATIENT)
Dept: NURSING | Facility: CLINIC | Age: 28
End: 2023-08-22
Payer: COMMERCIAL

## 2023-08-23 NOTE — TELEPHONE ENCOUNTER
"Patient calling.    Worked outside all day today and is experiencing muscle cramps in his legs. Rates pain 1/10. Reports that he lost 6 lbs today. Drank at least a gallon of water today. When he got home about 2 hours ago, he drank some gatorade and more water.    Disposition: Home care. Care advice given. Patient verbalized understanding and plans to follow advice.    Jackie Kim RN on 8/22/2023 at 8:14 PM     Reason for Disposition   [1] Caused by muscle cramps in the thigh, calf, or foot AND [2] present < 1 hour (brief, now gone)   Normal muscle cramps or sore muscles from heat exposure    Additional Information   Negative: Looks like a broken bone or dislocated joint (e.g., crooked or deformed)   Negative: Sounds like a life-threatening emergency to the triager   Negative: Chest pain   Negative: Difficulty breathing   Negative: Entire foot is cool or blue in comparison to other side   Negative: Unable to walk   Negative: [1] Red area or streak AND [2] fever   Negative: [1] Swollen joint AND [2] fever   Negative: [1] Cast on leg or ankle AND [2] now increased pain   Negative: Patient sounds very sick or weak to the triager   Negative: [1] SEVERE pain (e.g., excruciating, unable to do any normal activities) AND [2] not improved after 2 hours of pain medicine   Negative: [1] Thigh or calf pain AND [2] only 1 side AND [3] present > 1 hour (Exception: chronic unchanged pain)   Negative: [1] Thigh, calf, or ankle swelling AND [2] only 1 side   Negative: [1] Thigh, calf, or ankle swelling AND [2] bilateral AND [3] 1 side is more swollen   Negative: [1] Red area or streak AND [2] large (> 2 in. or 5 cm)   Negative: History of prior \"blood clot\" in leg or lungs (i.e., deep vein thrombosis, pulmonary embolism)   Negative: History of inherited increased risk of blood clots (e.g., Factor 5 Leiden, Anti-thrombin 3, Protein C or Protein S deficiency, Prothrombin mutation)   Negative: Major surgery in past month   " "Negative: Hip or leg fracture (broken bone) in past month (or had cast on leg or ankle in past month)   Negative: Illness requiring prolonged bedrest in past month (e.g., immobilization, long hospital stay)   Negative: Long-distance travel in past month (e.g., car, bus, train, plane; with trip lasting 6 or more hours)   Negative: Cancer treatment in past six months (or has cancer now)   Negative: [1] Painful rash AND [2] multiple small blisters grouped together (i.e., dermatomal distribution or \"band\" or \"stripe\")   Negative: Looks like a boil, infected sore, deep ulcer or other infected rash (spreading redness, pus)   Negative: [1] Localized rash is very painful AND [2] no fever   Negative: Numbness in a leg or foot (i.e., loss of sensation)   Negative: Localized pain, redness or hard lump along vein   Negative: [1] MODERATE pain (e.g., interferes with normal activities, limping) AND [2] present > 3 days   Negative: [1] Swollen joint AND [2] no fever or redness   Negative: [1] Leg pain which occurs after walking a certain distance AND [2] disappears with rest AND [3] age > 50   Negative: [1] Pain in front of the lower leg(s) (shins) AND [2] occurs with running or jumping exercise (e.g., jogging,  basketball)   Negative: [1] MILD pain (e.g., does not interfere with normal activities) AND [2] present > 7 days   Negative: Leg pain or muscle cramp is a chronic symptom (recurrent or ongoing AND present > 4 weeks)   Negative: Caused by strained muscle   Negative: Caused by overuse from recent vigorous activity (e.g., aerobics, jogging/running, physical work, prolonged walking, sports)   Negative: Fever > 104 F (40 C)   Negative: Unconscious or difficult to awaken   Negative: Acting confused (e.g., disoriented, slurred speech)   Negative: Seizure has occurred   Negative: Very weak (e.g., can't stand)   Negative: Has fainted (passed out)   Negative: Sounds like a life-threatening emergency to the triager   Negative: Unable " to walk, or can only walk with assistance (e.g., requires support)   Negative: Fever > 103 F (39.4 C)   Negative: [1] Dizziness or weakness AND [2] persists after 2 hours of rest and drinking liquids   Negative: [1] Fever AND [2] persists after 2 hours of rest and drinking liquids   Negative: Vomiting interferes with drinking fluids   Negative: Patient sounds very sick or weak to the  triager   Negative: [1] Painful muscle cramps AND [2] not resolved after 4 hours of rest and drinking liquids   Negative: [1] Fever > 101 F (38.3 C) AND [2] age > 60 years   Negative: [1] Fever > 100.0 F (37.8 C) AND [2] bedridden (e.g., nursing home patient, CVA, chronic illness, recovering from surgery)   Negative: [1] Fever > 100.0 F (37.8 C) AND [2] diabetes mellitus or weak immune system (e.g., HIV positive, cancer chemo, splenectomy, organ transplant, chronic steroids)    Protocols used: Leg Pain-A-AH, Heat Exposure (Heat Exhaustion and Heat Stroke)-A-AH

## 2024-01-28 ENCOUNTER — HEALTH MAINTENANCE LETTER (OUTPATIENT)
Age: 29
End: 2024-01-28